# Patient Record
Sex: FEMALE | Race: BLACK OR AFRICAN AMERICAN | NOT HISPANIC OR LATINO | URBAN - METROPOLITAN AREA
[De-identification: names, ages, dates, MRNs, and addresses within clinical notes are randomized per-mention and may not be internally consistent; named-entity substitution may affect disease eponyms.]

---

## 2017-01-23 PROBLEM — Z00.00 ENCOUNTER FOR PREVENTIVE HEALTH EXAMINATION: Status: ACTIVE | Noted: 2017-01-23

## 2017-01-25 ENCOUNTER — OUTPATIENT (OUTPATIENT)
Dept: OUTPATIENT SERVICES | Facility: HOSPITAL | Age: 62
LOS: 1 days | Discharge: HOME | End: 2017-01-25

## 2017-03-15 ENCOUNTER — APPOINTMENT (OUTPATIENT)
Dept: VASCULAR SURGERY | Facility: CLINIC | Age: 62
End: 2017-03-15

## 2017-04-18 ENCOUNTER — OUTPATIENT (OUTPATIENT)
Dept: OUTPATIENT SERVICES | Facility: HOSPITAL | Age: 62
LOS: 1 days | Discharge: HOME | End: 2017-04-18

## 2017-06-27 DIAGNOSIS — R79.9 ABNORMAL FINDING OF BLOOD CHEMISTRY, UNSPECIFIED: ICD-10-CM

## 2017-08-09 ENCOUNTER — APPOINTMENT (OUTPATIENT)
Dept: VASCULAR SURGERY | Facility: CLINIC | Age: 62
End: 2017-08-09

## 2017-09-06 DIAGNOSIS — I67.89 OTHER CEREBROVASCULAR DISEASE: ICD-10-CM

## 2017-09-27 ENCOUNTER — APPOINTMENT (OUTPATIENT)
Dept: VASCULAR SURGERY | Facility: CLINIC | Age: 62
End: 2017-09-27
Payer: MEDICARE

## 2017-10-04 ENCOUNTER — APPOINTMENT (OUTPATIENT)
Dept: VASCULAR SURGERY | Facility: CLINIC | Age: 62
End: 2017-10-04
Payer: MEDICARE

## 2017-10-04 VITALS
HEART RATE: 88 BPM | DIASTOLIC BLOOD PRESSURE: 75 MMHG | HEIGHT: 64 IN | WEIGHT: 76.99 LBS | SYSTOLIC BLOOD PRESSURE: 138 MMHG | OXYGEN SATURATION: 98 % | BODY MASS INDEX: 13.14 KG/M2

## 2017-10-04 DIAGNOSIS — Z87.448 PERSONAL HISTORY OF OTHER DISEASES OF URINARY SYSTEM: ICD-10-CM

## 2017-10-04 DIAGNOSIS — Z86.79 PERSONAL HISTORY OF OTHER DISEASES OF THE CIRCULATORY SYSTEM: ICD-10-CM

## 2017-10-04 PROCEDURE — 99213 OFFICE O/P EST LOW 20 MIN: CPT

## 2017-11-15 ENCOUNTER — APPOINTMENT (OUTPATIENT)
Dept: VASCULAR SURGERY | Facility: CLINIC | Age: 62
End: 2017-11-15
Payer: MEDICARE

## 2017-11-15 VITALS
SYSTOLIC BLOOD PRESSURE: 120 MMHG | DIASTOLIC BLOOD PRESSURE: 75 MMHG | HEART RATE: 67 BPM | HEIGHT: 64 IN | OXYGEN SATURATION: 96 %

## 2017-11-15 DIAGNOSIS — I73.9 PERIPHERAL VASCULAR DISEASE, UNSPECIFIED: ICD-10-CM

## 2017-11-15 PROCEDURE — 99213 OFFICE O/P EST LOW 20 MIN: CPT

## 2017-11-15 PROCEDURE — 93922 UPR/L XTREMITY ART 2 LEVELS: CPT

## 2018-04-12 ENCOUNTER — OUTPATIENT (OUTPATIENT)
Dept: OUTPATIENT SERVICES | Facility: HOSPITAL | Age: 63
LOS: 1 days | Discharge: HOME | End: 2018-04-12

## 2018-04-12 DIAGNOSIS — I10 ESSENTIAL (PRIMARY) HYPERTENSION: ICD-10-CM

## 2018-04-12 DIAGNOSIS — E21.3 HYPERPARATHYROIDISM, UNSPECIFIED: ICD-10-CM

## 2018-07-23 ENCOUNTER — APPOINTMENT (OUTPATIENT)
Dept: VASCULAR SURGERY | Facility: CLINIC | Age: 63
End: 2018-07-23

## 2018-08-01 ENCOUNTER — APPOINTMENT (OUTPATIENT)
Dept: VASCULAR SURGERY | Facility: CLINIC | Age: 63
End: 2018-08-01

## 2018-08-09 ENCOUNTER — OUTPATIENT (OUTPATIENT)
Dept: OUTPATIENT SERVICES | Facility: HOSPITAL | Age: 63
LOS: 1 days | Discharge: HOME | End: 2018-08-09

## 2018-08-09 DIAGNOSIS — N18.6 END STAGE RENAL DISEASE: ICD-10-CM

## 2018-08-09 DIAGNOSIS — I69.351 HEMIPLEGIA AND HEMIPARESIS FOLLOWING CEREBRAL INFARCTION AFFECTING RIGHT DOMINANT SIDE: ICD-10-CM

## 2018-08-09 DIAGNOSIS — N25.81 SECONDARY HYPERPARATHYROIDISM OF RENAL ORIGIN: ICD-10-CM

## 2018-08-09 DIAGNOSIS — E11.9 TYPE 2 DIABETES MELLITUS WITHOUT COMPLICATIONS: ICD-10-CM

## 2018-08-09 DIAGNOSIS — G40.109 LOCALIZATION-RELATED (FOCAL) (PARTIAL) SYMPTOMATIC EPILEPSY AND EPILEPTIC SYNDROMES WITH SIMPLE PARTIAL SEIZURES, NOT INTRACTABLE, WITHOUT STATUS EPILEPTICUS: ICD-10-CM

## 2018-08-09 DIAGNOSIS — E07.9 DISORDER OF THYROID, UNSPECIFIED: ICD-10-CM

## 2018-10-03 ENCOUNTER — APPOINTMENT (OUTPATIENT)
Dept: VASCULAR SURGERY | Facility: CLINIC | Age: 63
End: 2018-10-03

## 2018-10-25 ENCOUNTER — OUTPATIENT (OUTPATIENT)
Dept: OUTPATIENT SERVICES | Facility: HOSPITAL | Age: 63
LOS: 1 days | Discharge: HOME | End: 2018-10-25

## 2018-10-29 DIAGNOSIS — I10 ESSENTIAL (PRIMARY) HYPERTENSION: ICD-10-CM

## 2018-10-29 DIAGNOSIS — D64.9 ANEMIA, UNSPECIFIED: ICD-10-CM

## 2018-11-07 ENCOUNTER — OUTPATIENT (OUTPATIENT)
Dept: OUTPATIENT SERVICES | Facility: HOSPITAL | Age: 63
LOS: 1 days | Discharge: HOME | End: 2018-11-07

## 2018-11-08 ENCOUNTER — OUTPATIENT (OUTPATIENT)
Dept: OUTPATIENT SERVICES | Facility: HOSPITAL | Age: 63
LOS: 1 days | Discharge: HOME | End: 2018-11-08

## 2018-11-08 DIAGNOSIS — Z02.9 ENCOUNTER FOR ADMINISTRATIVE EXAMINATIONS, UNSPECIFIED: ICD-10-CM

## 2018-11-09 DIAGNOSIS — N18.6 END STAGE RENAL DISEASE: ICD-10-CM

## 2018-11-20 ENCOUNTER — OUTPATIENT (OUTPATIENT)
Dept: OUTPATIENT SERVICES | Facility: HOSPITAL | Age: 63
LOS: 1 days | Discharge: HOME | End: 2018-11-20

## 2018-11-21 ENCOUNTER — APPOINTMENT (OUTPATIENT)
Dept: VASCULAR SURGERY | Facility: CLINIC | Age: 63
End: 2018-11-21

## 2018-11-23 DIAGNOSIS — N18.6 END STAGE RENAL DISEASE: ICD-10-CM

## 2019-04-10 ENCOUNTER — APPOINTMENT (OUTPATIENT)
Dept: OTOLARYNGOLOGY | Facility: CLINIC | Age: 64
End: 2019-04-10
Payer: MEDICAID

## 2019-04-10 PROCEDURE — 99204 OFFICE O/P NEW MOD 45 MIN: CPT | Mod: 25

## 2019-04-10 PROCEDURE — 31575 DIAGNOSTIC LARYNGOSCOPY: CPT

## 2019-04-10 NOTE — PROCEDURE
[Lesion] : lesion identified by mirror examination needing further evaluation [Topical Lidocaine] : topical lidocaine [Oxymetazoline HCl] : oxymetazoline HCl [Normal] : posterior cricoid area had healthy pink mucosa in the interarytenoid area and the esophageal inlet

## 2019-04-10 NOTE — HISTORY OF PRESENT ILLNESS
[de-identified] : Patient here today due to her thyroid. Patient was sent from dialysis. Patient admits having dysphagia. Patient states it has been going on for a while. Excessive mucus. Patient presents with tertiary hyperparathyroidism secondary to end stage renal disease. Patient currently on dialysis, sent from dialysis. Patient with complaints of dysphagia.

## 2019-05-19 ENCOUNTER — FORM ENCOUNTER (OUTPATIENT)
Age: 64
End: 2019-05-19

## 2019-05-20 ENCOUNTER — OUTPATIENT (OUTPATIENT)
Dept: OUTPATIENT SERVICES | Facility: HOSPITAL | Age: 64
LOS: 1 days | Discharge: HOME | End: 2019-05-20
Payer: MEDICARE

## 2019-05-20 VITALS
RESPIRATION RATE: 16 BRPM | HEART RATE: 84 BPM | DIASTOLIC BLOOD PRESSURE: 74 MMHG | OXYGEN SATURATION: 97 % | TEMPERATURE: 96 F | HEIGHT: 63 IN | SYSTOLIC BLOOD PRESSURE: 163 MMHG | WEIGHT: 143.3 LBS

## 2019-05-20 DIAGNOSIS — Z98.51 TUBAL LIGATION STATUS: Chronic | ICD-10-CM

## 2019-05-20 DIAGNOSIS — Z01.818 ENCOUNTER FOR OTHER PREPROCEDURAL EXAMINATION: ICD-10-CM

## 2019-05-20 DIAGNOSIS — Z98.890 OTHER SPECIFIED POSTPROCEDURAL STATES: Chronic | ICD-10-CM

## 2019-05-20 DIAGNOSIS — E21.2 OTHER HYPERPARATHYROIDISM: ICD-10-CM

## 2019-05-20 PROCEDURE — 93010 ELECTROCARDIOGRAM REPORT: CPT

## 2019-05-20 PROCEDURE — 71046 X-RAY EXAM CHEST 2 VIEWS: CPT | Mod: 26

## 2019-05-20 NOTE — H&P PST ADULT - NSICDXPASTSURGICALHX_GEN_ALL_CORE_FT
PAST SURGICAL HISTORY:  H/O tubal ligation     H/O umbilical hernia repair     S/P arteriovenous (AV) fistula creation

## 2019-05-20 NOTE — H&P PST ADULT - GASTROINTESTINAL
I will have my RN call to get a better understanding of any myalgias.  Rosuvastatin 5 mg once a day is a pretty minimal dose.  Options are pravastatin or PCSK9 Inhibitor.  Kyle Montana MD, FACC  June 21, 2017 10:23 PM     Soft, non-tender, no hepatosplenomegaly, normal bowel sounds

## 2019-05-20 NOTE — H&P PST ADULT - NSICDXPASTMEDICALHX_GEN_ALL_CORE_FT
PAST MEDICAL HISTORY:  Atrial fibrillation     Diabetes     ESRD (end stage renal disease) on dialysis tues, th,sa    HTN (hypertension)     Murmur     Peripheral neuropathy     Seizures 2009, per pt none since    Stroke 2009, 2013

## 2019-05-20 NOTE — H&P PST ADULT - REASON FOR ADMISSION
63 yo wc bound pt arrives from home w/ aid, poor historian, has very limited information pt able to provide hx when asked, but unsure of names of drs.; med list obtained from dialysis center. pt aware of upcoming surgery& is a&o x 3; unable to draw blood from patient, informed dialysis center& will obtain blood work necessarry 5/21/19 when @ dialysis. pt is aware.  pt wc bound, does not ambulate, denies chest pain, palpitations, shortness of breath, dyspnea, or dysuria (very little urination) 63 yo wc bound pt arrives from home w/ aid, poor historian, has very limited information pt able to provide hx when asked, but unsure of names of drs.; med list obtained from dialysis center. pt aware of upcoming surgery& is a&o x 3; pt is scheduled for parathyroidectomy, unable to draw blood from patient, informed dialysis center& will obtain blood work necessarry 5/21/19 when @ dialysis. pt is aware.  pt wc bound, does not ambulate, denies chest pain, palpitations, shortness of breath, dyspnea, or dysuria (very little urination) 65 yo wc bound pt arrives from home w/ aid, poor historian, has very limited information pt able to provide hx when asked, but unsure of names of drs.; med list obtained from dialysis center. pt aware of upcoming surgery& is a&o x 3; pt is scheduled for parathyroidectomy, unable to draw blood from patient, informed dialysis center& will obtain necessary blood work  on 5/21/19 when @ dialysis. pt is aware.  pt wc bound, does not ambulate, denies chest pain, palpitations, shortness of breath, dyspnea, or dysuria (very little urination)

## 2019-05-21 ENCOUNTER — OUTPATIENT (OUTPATIENT)
Dept: OUTPATIENT SERVICES | Facility: HOSPITAL | Age: 64
LOS: 1 days | Discharge: HOME | End: 2019-05-21

## 2019-05-21 ENCOUNTER — FORM ENCOUNTER (OUTPATIENT)
Age: 64
End: 2019-05-21

## 2019-05-21 DIAGNOSIS — Z98.890 OTHER SPECIFIED POSTPROCEDURAL STATES: Chronic | ICD-10-CM

## 2019-05-21 DIAGNOSIS — Z98.51 TUBAL LIGATION STATUS: Chronic | ICD-10-CM

## 2019-05-21 PROBLEM — I10 ESSENTIAL (PRIMARY) HYPERTENSION: Chronic | Status: ACTIVE | Noted: 2019-05-20

## 2019-05-21 PROBLEM — N18.6 END STAGE RENAL DISEASE: Chronic | Status: ACTIVE | Noted: 2019-05-20

## 2019-05-21 PROBLEM — G62.9 POLYNEUROPATHY, UNSPECIFIED: Chronic | Status: ACTIVE | Noted: 2019-05-20

## 2019-05-21 PROBLEM — R01.1 CARDIAC MURMUR, UNSPECIFIED: Chronic | Status: ACTIVE | Noted: 2019-05-20

## 2019-05-21 PROBLEM — I48.91 UNSPECIFIED ATRIAL FIBRILLATION: Chronic | Status: ACTIVE | Noted: 2019-05-20

## 2019-05-21 PROBLEM — E11.9 TYPE 2 DIABETES MELLITUS WITHOUT COMPLICATIONS: Chronic | Status: ACTIVE | Noted: 2019-05-20

## 2019-05-21 PROBLEM — I63.9 CEREBRAL INFARCTION, UNSPECIFIED: Chronic | Status: ACTIVE | Noted: 2019-05-20

## 2019-05-22 ENCOUNTER — OUTPATIENT (OUTPATIENT)
Dept: OUTPATIENT SERVICES | Facility: HOSPITAL | Age: 64
LOS: 1 days | Discharge: HOME | End: 2019-05-22
Payer: COMMERCIAL

## 2019-05-22 DIAGNOSIS — Z98.51 TUBAL LIGATION STATUS: Chronic | ICD-10-CM

## 2019-05-22 DIAGNOSIS — Z98.890 OTHER SPECIFIED POSTPROCEDURAL STATES: Chronic | ICD-10-CM

## 2019-05-22 DIAGNOSIS — E21.2 OTHER HYPERPARATHYROIDISM: ICD-10-CM

## 2019-05-22 PROCEDURE — 76536 US EXAM OF HEAD AND NECK: CPT | Mod: 26

## 2019-05-28 DIAGNOSIS — I10 ESSENTIAL (PRIMARY) HYPERTENSION: ICD-10-CM

## 2019-05-28 DIAGNOSIS — E11.9 TYPE 2 DIABETES MELLITUS WITHOUT COMPLICATIONS: ICD-10-CM

## 2019-05-28 DIAGNOSIS — D64.9 ANEMIA, UNSPECIFIED: ICD-10-CM

## 2019-05-28 DIAGNOSIS — I48.91 UNSPECIFIED ATRIAL FIBRILLATION: ICD-10-CM

## 2019-06-01 ENCOUNTER — OUTPATIENT (OUTPATIENT)
Dept: OUTPATIENT SERVICES | Facility: HOSPITAL | Age: 64
LOS: 1 days | Discharge: HOME | End: 2019-06-01

## 2019-06-01 DIAGNOSIS — I48.91 UNSPECIFIED ATRIAL FIBRILLATION: ICD-10-CM

## 2019-06-01 DIAGNOSIS — Z98.51 TUBAL LIGATION STATUS: Chronic | ICD-10-CM

## 2019-06-01 DIAGNOSIS — Z98.890 OTHER SPECIFIED POSTPROCEDURAL STATES: Chronic | ICD-10-CM

## 2019-06-02 PROBLEM — R56.9 UNSPECIFIED CONVULSIONS: Chronic | Status: ACTIVE | Noted: 2019-05-20

## 2019-06-03 ENCOUNTER — INPATIENT (INPATIENT)
Facility: HOSPITAL | Age: 64
LOS: 5 days | Discharge: HOME HEALTH | End: 2019-06-09
Attending: OTOLARYNGOLOGY | Admitting: OTOLARYNGOLOGY
Payer: MEDICARE

## 2019-06-03 ENCOUNTER — APPOINTMENT (OUTPATIENT)
Dept: OTOLARYNGOLOGY | Facility: HOSPITAL | Age: 64
End: 2019-06-03
Payer: MEDICARE

## 2019-06-03 ENCOUNTER — RESULT REVIEW (OUTPATIENT)
Age: 64
End: 2019-06-03

## 2019-06-03 VITALS
HEIGHT: 64 IN | SYSTOLIC BLOOD PRESSURE: 145 MMHG | HEART RATE: 70 BPM | RESPIRATION RATE: 18 BRPM | WEIGHT: 138.01 LBS | DIASTOLIC BLOOD PRESSURE: 76 MMHG | TEMPERATURE: 98 F

## 2019-06-03 DIAGNOSIS — Z98.51 TUBAL LIGATION STATUS: Chronic | ICD-10-CM

## 2019-06-03 DIAGNOSIS — Z98.890 OTHER SPECIFIED POSTPROCEDURAL STATES: Chronic | ICD-10-CM

## 2019-06-03 LAB
ALBUMIN SERPL ELPH-MCNC: 3.3 G/DL — LOW (ref 3.5–5.2)
ANION GAP SERPL CALC-SCNC: 16 MMOL/L — HIGH (ref 7–14)
BUN SERPL-MCNC: 43 MG/DL — HIGH (ref 10–20)
CALCIUM SERPL-MCNC: 9.2 MG/DL — SIGNIFICANT CHANGE UP (ref 8.5–10.1)
CALCIUM SERPL-MCNC: 9.2 MG/DL — SIGNIFICANT CHANGE UP (ref 8.5–10.1)
CHLORIDE SERPL-SCNC: 100 MMOL/L — SIGNIFICANT CHANGE UP (ref 98–110)
CK MB CFR SERPL CALC: 1.9 NG/ML — SIGNIFICANT CHANGE UP (ref 0.6–6.3)
CK SERPL-CCNC: 79 U/L — SIGNIFICANT CHANGE UP (ref 0–225)
CO2 SERPL-SCNC: 25 MMOL/L — SIGNIFICANT CHANGE UP (ref 17–32)
CREAT SERPL-MCNC: 6.5 MG/DL — CRITICAL HIGH (ref 0.7–1.5)
GAS PNL BLDA: SIGNIFICANT CHANGE UP
GLUCOSE BLDC GLUCOMTR-MCNC: 102 MG/DL — HIGH (ref 70–99)
GLUCOSE BLDC GLUCOMTR-MCNC: 128 MG/DL — HIGH (ref 70–99)
GLUCOSE SERPL-MCNC: 128 MG/DL — HIGH (ref 70–99)
MAGNESIUM SERPL-MCNC: 2.1 MG/DL — SIGNIFICANT CHANGE UP (ref 1.8–2.4)
POTASSIUM SERPL-MCNC: 4.1 MMOL/L — SIGNIFICANT CHANGE UP (ref 3.5–5)
POTASSIUM SERPL-SCNC: 4.1 MMOL/L — SIGNIFICANT CHANGE UP (ref 3.5–5)
PTH-INTACT IO % DIF SERPL: 277.2 PG/ML — HIGH (ref 19–73)
SODIUM SERPL-SCNC: 141 MMOL/L — SIGNIFICANT CHANGE UP (ref 135–146)
TROPONIN T SERPL-MCNC: 0.03 NG/ML — CRITICAL HIGH

## 2019-06-03 PROCEDURE — 93010 ELECTROCARDIOGRAM REPORT: CPT

## 2019-06-03 PROCEDURE — 88305 TISSUE EXAM BY PATHOLOGIST: CPT | Mod: 26

## 2019-06-03 PROCEDURE — 71045 X-RAY EXAM CHEST 1 VIEW: CPT | Mod: 26

## 2019-06-03 PROCEDURE — 60500 EXPLORE PARATHYROID GLANDS: CPT

## 2019-06-03 PROCEDURE — 99223 1ST HOSP IP/OBS HIGH 75: CPT

## 2019-06-03 RX ORDER — CALCIUM CARBONATE 500(1250)
2 TABLET ORAL
Refills: 0 | Status: DISCONTINUED | OUTPATIENT
Start: 2019-06-03 | End: 2019-06-04

## 2019-06-03 RX ORDER — INSULIN GLARGINE 100 [IU]/ML
10 INJECTION, SOLUTION SUBCUTANEOUS
Qty: 0 | Refills: 0 | DISCHARGE

## 2019-06-03 RX ORDER — DOCUSATE SODIUM 100 MG
1 CAPSULE ORAL
Qty: 0 | Refills: 0 | DISCHARGE

## 2019-06-03 RX ORDER — SODIUM CHLORIDE 9 MG/ML
1000 INJECTION, SOLUTION INTRAVENOUS
Refills: 0 | Status: DISCONTINUED | OUTPATIENT
Start: 2019-06-03 | End: 2019-06-04

## 2019-06-03 RX ORDER — METOPROLOL TARTRATE 50 MG
1 TABLET ORAL
Qty: 0 | Refills: 0 | DISCHARGE

## 2019-06-03 RX ORDER — DOCUSATE SODIUM 100 MG
100 CAPSULE ORAL
Refills: 0 | Status: DISCONTINUED | OUTPATIENT
Start: 2019-06-03 | End: 2019-06-09

## 2019-06-03 RX ORDER — AMLODIPINE BESYLATE 2.5 MG/1
5 TABLET ORAL DAILY
Refills: 0 | Status: DISCONTINUED | OUTPATIENT
Start: 2019-06-03 | End: 2019-06-04

## 2019-06-03 RX ORDER — INSULIN LISPRO 100/ML
VIAL (ML) SUBCUTANEOUS
Refills: 0 | Status: DISCONTINUED | OUTPATIENT
Start: 2019-06-03 | End: 2019-06-06

## 2019-06-03 RX ORDER — LEVETIRACETAM 250 MG/1
1 TABLET, FILM COATED ORAL
Qty: 0 | Refills: 0 | DISCHARGE

## 2019-06-03 RX ORDER — SODIUM CHLORIDE 9 MG/ML
1000 INJECTION INTRAMUSCULAR; INTRAVENOUS; SUBCUTANEOUS
Refills: 0 | Status: DISCONTINUED | OUTPATIENT
Start: 2019-06-03 | End: 2019-06-03

## 2019-06-03 RX ORDER — OXYCODONE AND ACETAMINOPHEN 5; 325 MG/1; MG/1
2 TABLET ORAL EVERY 4 HOURS
Refills: 0 | Status: DISCONTINUED | OUTPATIENT
Start: 2019-06-03 | End: 2019-06-03

## 2019-06-03 RX ORDER — CALCITRIOL 0.5 UG/1
0.5 CAPSULE ORAL EVERY 12 HOURS
Refills: 0 | Status: DISCONTINUED | OUTPATIENT
Start: 2019-06-03 | End: 2019-06-04

## 2019-06-03 RX ORDER — GLUCAGON INJECTION, SOLUTION 0.5 MG/.1ML
1 INJECTION, SOLUTION SUBCUTANEOUS ONCE
Refills: 0 | Status: DISCONTINUED | OUTPATIENT
Start: 2019-06-03 | End: 2019-06-03

## 2019-06-03 RX ORDER — SODIUM CHLORIDE 9 MG/ML
3 INJECTION INTRAMUSCULAR; INTRAVENOUS; SUBCUTANEOUS EVERY 8 HOURS
Refills: 0 | Status: DISCONTINUED | OUTPATIENT
Start: 2019-06-03 | End: 2019-06-09

## 2019-06-03 RX ORDER — GABAPENTIN 400 MG/1
100 CAPSULE ORAL THREE TIMES A DAY
Refills: 0 | Status: DISCONTINUED | OUTPATIENT
Start: 2019-06-03 | End: 2019-06-09

## 2019-06-03 RX ORDER — APIXABAN 2.5 MG/1
1 TABLET, FILM COATED ORAL
Qty: 0 | Refills: 0 | DISCHARGE

## 2019-06-03 RX ORDER — DEXTROSE 50 % IN WATER 50 %
15 SYRINGE (ML) INTRAVENOUS ONCE
Refills: 0 | Status: DISCONTINUED | OUTPATIENT
Start: 2019-06-03 | End: 2019-06-09

## 2019-06-03 RX ORDER — MORPHINE SULFATE 50 MG/1
2 CAPSULE, EXTENDED RELEASE ORAL EVERY 4 HOURS
Refills: 0 | Status: DISCONTINUED | OUTPATIENT
Start: 2019-06-03 | End: 2019-06-03

## 2019-06-03 RX ORDER — OXYCODONE HYDROCHLORIDE 5 MG/1
5 TABLET ORAL EVERY 6 HOURS
Refills: 0 | Status: DISCONTINUED | OUTPATIENT
Start: 2019-06-03 | End: 2019-06-09

## 2019-06-03 RX ORDER — METOPROLOL TARTRATE 50 MG
25 TABLET ORAL
Refills: 0 | Status: DISCONTINUED | OUTPATIENT
Start: 2019-06-03 | End: 2019-06-06

## 2019-06-03 RX ORDER — OXYCODONE HYDROCHLORIDE 5 MG/1
10 TABLET ORAL EVERY 6 HOURS
Refills: 0 | Status: DISCONTINUED | OUTPATIENT
Start: 2019-06-03 | End: 2019-06-09

## 2019-06-03 RX ORDER — HEPARIN SODIUM 5000 [USP'U]/ML
5000 INJECTION INTRAVENOUS; SUBCUTANEOUS EVERY 8 HOURS
Refills: 0 | Status: DISCONTINUED | OUTPATIENT
Start: 2019-06-03 | End: 2019-06-06

## 2019-06-03 RX ORDER — ACETAMINOPHEN 500 MG
650 TABLET ORAL EVERY 4 HOURS
Refills: 0 | Status: DISCONTINUED | OUTPATIENT
Start: 2019-06-03 | End: 2019-06-06

## 2019-06-03 RX ORDER — RANITIDINE HYDROCHLORIDE 150 MG/1
0 TABLET, FILM COATED ORAL
Qty: 0 | Refills: 0 | DISCHARGE

## 2019-06-03 RX ORDER — LEVETIRACETAM 250 MG/1
250 TABLET, FILM COATED ORAL
Refills: 0 | Status: DISCONTINUED | OUTPATIENT
Start: 2019-06-03 | End: 2019-06-09

## 2019-06-03 RX ORDER — DEXTROSE 50 % IN WATER 50 %
25 SYRINGE (ML) INTRAVENOUS ONCE
Refills: 0 | Status: DISCONTINUED | OUTPATIENT
Start: 2019-06-03 | End: 2019-06-03

## 2019-06-03 RX ORDER — INSULIN ASPART 100 [IU]/ML
0 INJECTION, SOLUTION SUBCUTANEOUS
Qty: 0 | Refills: 0 | DISCHARGE

## 2019-06-03 RX ORDER — AMLODIPINE BESYLATE 2.5 MG/1
1 TABLET ORAL
Qty: 0 | Refills: 0 | DISCHARGE

## 2019-06-03 RX ORDER — GABAPENTIN 400 MG/1
0 CAPSULE ORAL
Qty: 0 | Refills: 0 | DISCHARGE

## 2019-06-03 RX ORDER — INSULIN GLARGINE 100 [IU]/ML
10 INJECTION, SOLUTION SUBCUTANEOUS AT BEDTIME
Refills: 0 | Status: DISCONTINUED | OUTPATIENT
Start: 2019-06-03 | End: 2019-06-09

## 2019-06-03 RX ORDER — MORPHINE SULFATE 50 MG/1
2 CAPSULE, EXTENDED RELEASE ORAL
Refills: 0 | Status: DISCONTINUED | OUTPATIENT
Start: 2019-06-03 | End: 2019-06-03

## 2019-06-03 RX ORDER — SEVELAMER CARBONATE 2400 MG/1
1600 POWDER, FOR SUSPENSION ORAL
Refills: 0 | Status: DISCONTINUED | OUTPATIENT
Start: 2019-06-03 | End: 2019-06-03

## 2019-06-03 RX ORDER — SENNA PLUS 8.6 MG/1
1 TABLET ORAL AT BEDTIME
Refills: 0 | Status: DISCONTINUED | OUTPATIENT
Start: 2019-06-03 | End: 2019-06-05

## 2019-06-03 RX ORDER — PANTOPRAZOLE SODIUM 20 MG/1
40 TABLET, DELAYED RELEASE ORAL AT BEDTIME
Refills: 0 | Status: DISCONTINUED | OUTPATIENT
Start: 2019-06-03 | End: 2019-06-05

## 2019-06-03 RX ORDER — DEXTROSE 50 % IN WATER 50 %
12.5 SYRINGE (ML) INTRAVENOUS ONCE
Refills: 0 | Status: DISCONTINUED | OUTPATIENT
Start: 2019-06-03 | End: 2019-06-03

## 2019-06-03 RX ORDER — OXYCODONE AND ACETAMINOPHEN 5; 325 MG/1; MG/1
1 TABLET ORAL EVERY 4 HOURS
Refills: 0 | Status: DISCONTINUED | OUTPATIENT
Start: 2019-06-03 | End: 2019-06-03

## 2019-06-03 RX ORDER — SODIUM CHLORIDE 9 MG/ML
1000 INJECTION INTRAMUSCULAR; INTRAVENOUS; SUBCUTANEOUS
Refills: 0 | Status: DISCONTINUED | OUTPATIENT
Start: 2019-06-03 | End: 2019-06-04

## 2019-06-03 RX ADMIN — PANTOPRAZOLE SODIUM 40 MILLIGRAM(S): 20 TABLET, DELAYED RELEASE ORAL at 23:12

## 2019-06-03 RX ADMIN — CALCITRIOL 0.5 MICROGRAM(S): 0.5 CAPSULE ORAL at 19:48

## 2019-06-03 RX ADMIN — SODIUM CHLORIDE 3 MILLILITER(S): 9 INJECTION INTRAMUSCULAR; INTRAVENOUS; SUBCUTANEOUS at 23:12

## 2019-06-03 RX ADMIN — OXYCODONE HYDROCHLORIDE 10 MILLIGRAM(S): 5 TABLET ORAL at 22:00

## 2019-06-03 RX ADMIN — Medication 25 MILLIGRAM(S): at 19:48

## 2019-06-03 RX ADMIN — Medication 2 TABLET(S): at 20:29

## 2019-06-03 RX ADMIN — GABAPENTIN 100 MILLIGRAM(S): 400 CAPSULE ORAL at 21:58

## 2019-06-03 RX ADMIN — HEPARIN SODIUM 5000 UNIT(S): 5000 INJECTION INTRAVENOUS; SUBCUTANEOUS at 23:10

## 2019-06-03 RX ADMIN — SENNA PLUS 1 TABLET(S): 8.6 TABLET ORAL at 22:36

## 2019-06-03 RX ADMIN — LEVETIRACETAM 250 MILLIGRAM(S): 250 TABLET, FILM COATED ORAL at 19:47

## 2019-06-03 RX ADMIN — INSULIN GLARGINE 10 UNIT(S): 100 INJECTION, SOLUTION SUBCUTANEOUS at 23:10

## 2019-06-03 RX ADMIN — Medication 100 MILLIGRAM(S): at 19:48

## 2019-06-03 NOTE — PROGRESS NOTE ADULT - ASSESSMENT
64 y.o female with tertiary hyperparathyroidism, s/p 3 gland parathyroidectomy - POD #0, doing well.    ·	cont to monitor serum caclium (corrected)/ionized calcium  ·	replace calcium as per renal recommendations  ·	PO rocaltrol, calcium carbonate  ·	renal diet - can advance from clears -> full liquids -> soft renal diet as tolerated  ·	pain control, antiemetics prn  ·	resume HD (T/T/S)  ·	monitor hemovac output  ·	D/C sevelamer as per renal  ·	FS qAC +HS, coverage with insulin as needed (on novolog at home) - continued on Lantus at bedtime  ·	cont home meds  ·	ICU management until calcium is stable/no further need for IV replacement  ·	w/d with attng

## 2019-06-03 NOTE — ASU PATIENT PROFILE, ADULT - PMH
Atrial fibrillation    Diabetes    ESRD (end stage renal disease) on dialysis  tues, th,sa  HTN (hypertension)    Murmur    Peripheral neuropathy    Seizures  2009, per pt none since  Stroke  2009, 2013

## 2019-06-03 NOTE — CONSULT NOTE ADULT - SUBJECTIVE AND OBJECTIVE BOX
SICU Consultation Note  ======================================================================================================  GIL GARCIA  MRN-[id number]    64 y.o F with past medical hx as below, presents  S/P parathyroidectomy secondary to tertiary hyperparathyroidsm. Prior to surgery patient experiencing throat discomfort and dysphagia, tertiary hyperparathyroidsm likely secondary to ESRD.  Intraoperative findings 4 gland with hyperplasia, RT inferior, left inferior and left superior glans removed after frozen section confirmation.  Course otherwise uncomplicated. PT extubated transfered to PACU in stable and guarded conditions. SICU consulted for close monitoring in the setting of past medical hx of multiple comorbidities, and close monitoring of possible electrolyte imbalances.       Procedure:  OR time: 1hr 56min     EBL: 10cc          IV Fluids: 800cc       Blood Products: N/A                UOP: N/A          PAST MEDICAL & SURGICAL HISTORY:  Atrial fibrillation  Murmur  Peripheral neuropathy  Seizures: 2009, per pt none since  Diabetes  ESRD (end stage renal disease) on dialysis: tues, th,sa  Stroke: 2009, 2013  HTN (hypertension)  H/O umbilical hernia repair  H/O tubal ligation  S/P arteriovenous (AV) fistula creation      Home Meds:   Home Medications:  Colace 100 mg oral capsule: 1 cap(s) orally 2 times a day (03 Jun 2019 10:11)  Eliquis 2.5 mg oral tablet: 1 tab(s) orally 2 times a day (03 Jun 2019 10:11)  gabapentin 100 mg oral tablet: orally 3 times a day (03 Jun 2019 10:11)  Keppra 250 mg oral tablet: 1 tab(s) orally 2 times a day (03 Jun 2019 10:11)  Lantus 100 units/mL subcutaneous solution: 10 unit(s) subcutaneous once a day (at bedtime) (03 Jun 2019 10:11)  Metoprolol Tartrate 25 mg oral tablet: 1 tab(s) orally 2 times a day (03 Jun 2019 10:11)  Norvasc 5 mg oral tablet: 1 tab(s) orally once a day (03 Jun 2019 10:11)  NovoLOG 100 units/mL subcutaneous solution: sliding scale , ac (03 Jun 2019 10:11)  raNITIdine 150 mg oral tablet: orally once a day (03 Jun 2019 10:11)  sevelamer hydrochloride 800 mg oral tablet: 2 tab(s) orally 3 times a day (03 Jun 2019 10:11)      Allergies    No Known Allergies    Intolerances          Advanced Directives: Full Code      CURRENT MEDICATIONS:   acetaminophen   Tablet .. 650 milliGRAM(s) Oral every 4 hours PRN  amLODIPine   Tablet 5 milliGRAM(s) Oral daily  calcitriol   Capsule 0.5 MICROGram(s) Oral every 12 hours  docusate sodium 100 milliGRAM(s) Oral two times a day  gabapentin 100 milliGRAM(s) Oral three times a day  insulin glargine Injectable (LANTUS) 10 Unit(s) SubCutaneous at bedtime  levETIRAcetam 250 milliGRAM(s) Oral two times a day  metoprolol tartrate 25 milliGRAM(s) Oral two times a day  morphine  - Injectable 2 milliGRAM(s) IV Push every 4 hours PRN  morphine  - Injectable 2 milliGRAM(s) IV Push every 10 minutes PRN  oxyCODONE    5 mG/acetaminophen 325 mG 1 Tablet(s) Oral every 4 hours PRN  oxyCODONE    5 mG/acetaminophen 325 mG 2 Tablet(s) Oral every 4 hours PRN  sodium chloride 0.9% lock flush 3 milliLiter(s) IV Push every 8 hours  sodium chloride 0.9%. 1000 milliLiter(s) (50 mL/Hr) IV Continuous <Continuous>            VITAL SIGNS, INS/OUTS (Last 24hours):    ICU Vital Signs Last 24 Hrs  T(C): 36.4 (03 Jun 2019 16:23), Max: 36.6 (03 Jun 2019 10:14)  T(F): 97.6 (03 Jun 2019 16:23), Max: 97.8 (03 Jun 2019 10:14)  HR: 78 (03 Jun 2019 16:38) (70 - 82)  BP: 155/66 (03 Jun 2019 16:38) (145/76 - 155/66)  BP(mean): --  ABP: 166/70 (03 Jun 2019 16:38) (158/62 - 166/70)  ABP(mean): 112 (03 Jun 2019 16:38) (106 - 112)  RR: 11 (03 Jun 2019 16:38) (11 - 18)  SpO2: 95% (03 Jun 2019 16:38) (95% - 96%)    I&O's Summary        Height (cm): 162.56 (06-03-19)  Weight (kg): 62.6 (06-03-19)  BMI (kg/m2): 23.7 (06-03-19)  BSA (m2): 1.67 (06-03-19)    Physical Exam: Conducted Dr. Lico Cruz  ---------------------------------------------------------------------------------------  RASS:   GCS:    E/M/V  Exam: A&Ox3, no focal deficits    RESPIRATORY:  Intubated/Tracheostomy  Lungs clear to auscultation b/l, Normal expansion/effort  Mechanical Ventilation:     CARDIOVASCULAR:   S1/S2.  RRR  No peripheral edema    GASTROINTESTINAL:  Abdomen soft, non-tender, non-distended, no wounds or discoloration  Colostomy/Ileostomy/NG/OG tube in place    MUSCULOSKELETAL:  Extremities warm, pink, well-perfused.  Palpable/Doppler pulses signals      DERM:  No skin breakdown     :   Exam: Kelly catheter in place.       Tubes/Lines/Drains   ----------------------------------------------------------------------------------------------------------  [x] Peripheral IV  [] Central Venous Line    R/L        IJ/Femoral             Date Placed:    [] Arterial Line		   R/L         Radial/Femoral    Date Placed:   [] PICC:         	[] Midline		                                  Date Placed:   [] Urinary Catheter Kelly                                             Date Placed:       LABS  --------------------------------------------------------------------------------------  LFTs      Cardiac Markers        Coagulation      Arterial Blood Gas  ABG - ( 03 Jun 2019 16:48 )  pH, Arterial: 7.38  pH, Blood: x     /  pCO2: 47    /  pO2: 165   / HCO3: 28    / Base Excess: 2.4   /  SaO2: 99                  Blood Sugar  CAPILLARY BLOOD GLUCOSE      POCT Blood Glucose.: 102 mg/dL (03 Jun 2019 10:10)      Urinalysis      Cultures            CT/XRAY/ECHO/TCD/EEG  ----------------------------------------------------------------------------------------------          --------------------------------------------------------------------------------------  Admit Diagnosis: E21.2/63691     Critical Care Diagnoses: SICU Consultation Note  ======================================================================================================  GIL GARCIA  MRN-[id number]    64 y.o F with past medical hx as below, presents  S/P parathyroidectomy secondary to tertiary hyperparathyroidsm. Prior to surgery patient experiencing throat discomfort and dysphagia, tertiary hyperparathyroidsm likely secondary to ESRD.  Intraoperative findings 4 gland with hyperplasia, RT inferior, left inferior and left superior glans removed after frozen section confirmation.  Course otherwise uncomplicated. PT extubated transfered to PACU in stable and guarded conditions. SICU consulted for close monitoring in the setting of past medical hx of multiple comorbidities, and close monitoring of possible electrolyte imbalances.       Procedure:  OR time: 1hr 56min     EBL: 10cc          IV Fluids: 800cc       Blood Products: N/A                UOP: N/A          PAST MEDICAL & SURGICAL HISTORY:  Atrial fibrillation  Murmur  Peripheral neuropathy  Seizures: 2009, per pt none since  Diabetes  ESRD (end stage renal disease) on dialysis: tues, th,sa  Stroke: 2009, 2013  HTN (hypertension)  H/O umbilical hernia repair  H/O tubal ligation  S/P arteriovenous (AV) fistula creation      Home Meds:   Home Medications:  Colace 100 mg oral capsule: 1 cap(s) orally 2 times a day (03 Jun 2019 10:11)  Eliquis 2.5 mg oral tablet: 1 tab(s) orally 2 times a day (03 Jun 2019 10:11)  gabapentin 100 mg oral tablet: orally 3 times a day (03 Jun 2019 10:11)  Keppra 250 mg oral tablet: 1 tab(s) orally 2 times a day (03 Jun 2019 10:11)  Lantus 100 units/mL subcutaneous solution: 10 unit(s) subcutaneous once a day (at bedtime) (03 Jun 2019 10:11)  Metoprolol Tartrate 25 mg oral tablet: 1 tab(s) orally 2 times a day (03 Jun 2019 10:11)  Norvasc 5 mg oral tablet: 1 tab(s) orally once a day (03 Jun 2019 10:11)  NovoLOG 100 units/mL subcutaneous solution: sliding scale , ac (03 Jun 2019 10:11)  raNITIdine 150 mg oral tablet: orally once a day (03 Jun 2019 10:11)  sevelamer hydrochloride 800 mg oral tablet: 2 tab(s) orally 3 times a day (03 Jun 2019 10:11)      Allergies    No Known Allergies    Intolerances          Advanced Directives: Full Code      CURRENT MEDICATIONS:   acetaminophen   Tablet .. 650 milliGRAM(s) Oral every 4 hours PRN  amLODIPine   Tablet 5 milliGRAM(s) Oral daily  calcitriol   Capsule 0.5 MICROGram(s) Oral every 12 hours  docusate sodium 100 milliGRAM(s) Oral two times a day  gabapentin 100 milliGRAM(s) Oral three times a day  insulin glargine Injectable (LANTUS) 10 Unit(s) SubCutaneous at bedtime  levETIRAcetam 250 milliGRAM(s) Oral two times a day  metoprolol tartrate 25 milliGRAM(s) Oral two times a day  oxyCODONE    5 mG/acetaminophen 325 mG 1 Tablet(s) Oral every 4 hours PRN  oxyCODONE    5 mG/acetaminophen 325 mG 2 Tablet(s) Oral every 4 hours PRN  sodium chloride 0.9% lock flush 3 milliLiter(s) IV Push every 8 hours  sodium chloride 0.9%. 1000 milliLiter(s) (50 mL/Hr) IV Continuous <Continuous>    VITAL SIGNS, INS/OUTS (Last 24hours):    ICU Vital Signs Last 24 Hrs  T(C): 36.4 (03 Jun 2019 16:23), Max: 36.6 (03 Jun 2019 10:14)  T(F): 97.6 (03 Jun 2019 16:23), Max: 97.8 (03 Jun 2019 10:14)  HR: 78 (03 Jun 2019 16:38) (70 - 82)  BP: 155/66 (03 Jun 2019 16:38) (145/76 - 155/66)  BP(mean): --  ABP: 166/70 (03 Jun 2019 16:38) (158/62 - 166/70)  ABP(mean): 112 (03 Jun 2019 16:38) (106 - 112)  RR: 11 (03 Jun 2019 16:38) (11 - 18)  SpO2: 95% (03 Jun 2019 16:38) (95% - 96%)    I&O's Summary        Height (cm): 162.56 (06-03-19)  Weight (kg): 62.6 (06-03-19)  BMI (kg/m2): 23.7 (06-03-19)  BSA (m2): 1.67 (06-03-19)    Physical Exam: Conducted Dr. Lico Cruz  ---------------------------------------------------------------------------------------  RASS:   GCS:    E/M/V  Exam: A&Ox3, no focal deficits    RESPIRATORY:  Intubated/Tracheostomy  Lungs clear to auscultation b/l, Normal exp    CARDIOVASCULAR   S1/S2.  RRR  No peripheral edema    GASTROINTESTINAL:  Abdomen soft, non-tender, non-distended, no wounds or discoloration  Colostomy/Ileostomy/NG/OG tube in place    MUSCULOSKELETAL:  Extremities warm, pink, well-perfused.  Palpable/Doppler pulses signals      DERM:  No skin breakdown     :   Exam: Kelly catheter in place.       Tubes/Lines/Drains   ----------------------------------------------------------------------------------------------------------  [x] Peripheral IV  [] Central Venous Line    R/L        IJ/Femoral             Date Placed:    [] Arterial Line		   R/L         Radial/Femoral    Date Placed:   [] PICC:         	[] Midline		                                  Date Placed:   [] Urinary Catheter Kelly                                             Date Placed:       LABS  --------------------------------------------------------------------------------------  LFTs      Cardiac Markers        Coagulation      Arterial Blood Gas  ABG - ( 03 Jun 2019 16:48 )  pH, Arterial: 7.38  pH, Blood: x     /  pCO2: 47    /  pO2: 165   / HCO3: 28    / Base Excess: 2.4   /  SaO2: 99                  Blood Sugar  CAPILLARY BLOOD GLUCOSE      POCT Blood Glucose.: 102 mg/dL (03 Jun 2019 10:10)      Urinalysis      Cultures            CT/XRAY/ECHO/TCD/EEG  ----------------------------------------------------------------------------------------------          --------------------------------------------------------------------------------------  Admit Diagnosis: E21.2/84372     Critical Care Diagnoses: SICU Consultation Note  ======================================================================================================  GIL GARCIA  MRN-[id number]    64 y.o F with past medical hx as below, presents  S/P parathyroidectomy secondary to tertiary hyperparathyroidsm. Prior to surgery patient experiencing throat discomfort and dysphagia, tertiary hyperparathyroidsm likely secondary to ESRD.  Intraoperative findings 4 gland with hyperplasia, RT inferior, left inferior and left superior glans removed after frozen section confirmation.  Course otherwise uncomplicated. PT extubated transfered to PACU in stable and guarded conditions. SICU consulted for close monitoring in the setting of past medical hx of multiple comorbidities, and close monitoring of possible electrolyte imbalances.       Procedure:  OR time: 1hr 56min     EBL: 10cc          IV Fluids: 800cc       Blood Products: N/A                UOP: N/A          PAST MEDICAL & SURGICAL HISTORY:  Atrial fibrillation  Murmur  Peripheral neuropathy  Seizures: 2009, per pt none since  Diabetes  ESRD (end stage renal disease) on dialysis: tues, th,sa  Stroke: 2009, 2013  HTN (hypertension)  H/O umbilical hernia repair  H/O tubal ligation  S/P arteriovenous (AV) fistula creation      Home Meds:   Home Medications:  Colace 100 mg oral capsule: 1 cap(s) orally 2 times a day (03 Jun 2019 10:11)  Eliquis 2.5 mg oral tablet: 1 tab(s) orally 2 times a day (03 Jun 2019 10:11)  gabapentin 100 mg oral tablet: orally 3 times a day (03 Jun 2019 10:11)  Keppra 250 mg oral tablet: 1 tab(s) orally 2 times a day (03 Jun 2019 10:11)  Lantus 100 units/mL subcutaneous solution: 10 unit(s) subcutaneous once a day (at bedtime) (03 Jun 2019 10:11)  Metoprolol Tartrate 25 mg oral tablet: 1 tab(s) orally 2 times a day (03 Jun 2019 10:11)  Norvasc 5 mg oral tablet: 1 tab(s) orally once a day (03 Jun 2019 10:11)  NovoLOG 100 units/mL subcutaneous solution: sliding scale , ac (03 Jun 2019 10:11)  raNITIdine 150 mg oral tablet: orally once a day (03 Jun 2019 10:11)  sevelamer hydrochloride 800 mg oral tablet: 2 tab(s) orally 3 times a day (03 Jun 2019 10:11)      Allergies    No Known Allergies    Intolerances          Advanced Directives: Full Code      CURRENT MEDICATIONS:   acetaminophen   Tablet .. 650 milliGRAM(s) Oral every 4 hours PRN  amLODIPine   Tablet 5 milliGRAM(s) Oral daily  calcitriol   Capsule 0.5 MICROGram(s) Oral every 12 hours  docusate sodium 100 milliGRAM(s) Oral two times a day  gabapentin 100 milliGRAM(s) Oral three times a day  insulin glargine Injectable (LANTUS) 10 Unit(s) SubCutaneous at bedtime  levETIRAcetam 250 milliGRAM(s) Oral two times a day  metoprolol tartrate 25 milliGRAM(s) Oral two times a day  oxyCODONE    5 mG/acetaminophen 325 mG 1 Tablet(s) Oral every 4 hours PRN  oxyCODONE    5 mG/acetaminophen 325 mG 2 Tablet(s) Oral every 4 hours PRN  sodium chloride 0.9% lock flush 3 milliLiter(s) IV Push every 8 hours  sodium chloride 0.9%. 1000 milliLiter(s) (50 mL/Hr) IV Continuous <Continuous>    VITAL SIGNS, INS/OUTS (Last 24hours):    ICU Vital Signs Last 24 Hrs  T(C): 36.4 (03 Jun 2019 16:23), Max: 36.6 (03 Jun 2019 10:14)  T(F): 97.6 (03 Jun 2019 16:23), Max: 97.8 (03 Jun 2019 10:14)  HR: 78 (03 Jun 2019 16:38) (70 - 82)  BP: 155/66 (03 Jun 2019 16:38) (145/76 - 155/66)  BP(mean): --  ABP: 166/70 (03 Jun 2019 16:38) (158/62 - 166/70)  ABP(mean): 112 (03 Jun 2019 16:38) (106 - 112)  RR: 11 (03 Jun 2019 16:38) (11 - 18)  SpO2: 95% (03 Jun 2019 16:38) (95% - 96%)    I&O's Summary        Height (cm): 162.56 (06-03-19)  Weight (kg): 62.6 (06-03-19)  BMI (kg/m2): 23.7 (06-03-19)  BSA (m2): 1.67 (06-03-19)    Physical Exam: Conducted Dr. Lico Cruz  ---------------------------------------------------------------------------------------  RASS:   GCS:    E/M/V  Exam: A&Ox3, no focal deficits    RESPIRATORY:  Lungs clear to auscultation b/l, Normal exp, negative for stridor and rales.     ENT: normal voice sounds, negative for stridor or signs of obstruction. no blood seen in the pharynx.    Neck: wound dressing on right side of the neck, clean and dry. Drainage in place, positive for sanguinous discharge    CARDIOVASCULAR   S1/S2.  RRR, negative for JVD  No peripheral edema    GASTROINTESTINAL:  Abdomen soft, non-tender, non-distended, no wounds or discoloration    Neurology: right arm contracted with minimal movement, limited motion of the right lower ext. Negative for pupillary changes      MUSCULOSKELETAL:  Extremities warm, pink, well-perfused.  Palpable/Doppler pulses signals      DERM:  stage 4 decubitus ulcer    :   Exam: negative for Kelly placement, IV fistula positive for bruit, and thrilled      Tubes/Lines/Drains   ----------------------------------------------------------------------------------------------------------  [x] Peripheral IV  [] Central Venous Line    R/L        IJ/Femoral             Date Placed:    [] Arterial Line		   R/L         Radial/Femoral    Date Placed:   [] PICC:         	[] Midline		                                  Date Placed:   [] Urinary Catheter Kelly                                             Date Placed:       LABS  --------------------------------------------------------------------------------------  LFTs      Cardiac Markers        Coagulation      Arterial Blood Gas  ABG - ( 03 Jun 2019 16:48 )  pH, Arterial: 7.38  pH, Blood: x     /  pCO2: 47    /  pO2: 165   / HCO3: 28    / Base Excess: 2.4   /  SaO2: 99                  Blood Sugar  CAPILLARY BLOOD GLUCOSE      POCT Blood Glucose.: 102 mg/dL (03 Jun 2019 10:10)      Urinalysis      Cultures            CT/XRAY/ECHO/TCD/EEG  ----------------------------------------------------------------------------------------------          --------------------------------------------------------------------------------------  Admit Diagnosis: E21.2/14545     Critical Care Diagnoses: SICU Consultation Note  ======================================================================================================  GIL GARCIA  MRN-[id number]    64 y.o F with past medical hx as below, presents  S/P parathyroidectomy secondary to tertiary hyperparathyroidism Prior to surgery patient experiencing throat discomfort and dysphagia,  tertiary hyperparathyroidism likely secondary to ESRD.  Intraoperative findings 4 gland with hyperplasia, RT inferior, left inferior and left superior glans removed after frozen section confirmation.  Course otherwise uncomplicated. PT extubated transferred to PACU in stable and guarded conditions. SICU consulted for close monitoring in the setting of past medical hx of multiple comorbidities, and close monitoring of possible electrolyte imbalances.       Procedure:  OR time: 1hr 56min     EBL: 10cc          IV Fluids: 800cc       Blood Products: N/A                UOP: N/A          PAST MEDICAL & SURGICAL HISTORY:  Atrial fibrillation  Murmur  Peripheral neuropathy  Seizures: 2009, per pt none since  Diabetes  ESRD (end stage renal disease) on dialysis: tues, th,sa  Stroke: 2009, 2013  HTN (hypertension)  H/O umbilical hernia repair  H/O tubal ligation  S/P arteriovenous (AV) fistula creation      Home Meds:   Home Medications:  Colace 100 mg oral capsule: 1 cap(s) orally 2 times a day (03 Jun 2019 10:11)  Eliquis 2.5 mg oral tablet: 1 tab(s) orally 2 times a day (03 Jun 2019 10:11)  gabapentin 100 mg oral tablet: orally 3 times a day (03 Jun 2019 10:11)  Keppra 250 mg oral tablet: 1 tab(s) orally 2 times a day (03 Jun 2019 10:11)  Lantus 100 units/mL subcutaneous solution: 10 unit(s) subcutaneous once a day (at bedtime) (03 Jun 2019 10:11)  Metoprolol Tartrate 25 mg oral tablet: 1 tab(s) orally 2 times a day (03 Jun 2019 10:11)  Norvasc 5 mg oral tablet: 1 tab(s) orally once a day (03 Jun 2019 10:11)  NovoLOG 100 units/mL subcutaneous solution: sliding scale , ac (03 Jun 2019 10:11)  raNITIdine 150 mg oral tablet: orally once a day (03 Jun 2019 10:11)  sevelamer hydrochloride 800 mg oral tablet: 2 tab(s) orally 3 times a day (03 Jun 2019 10:11)      Allergies    No Known Allergies    Intolerances          Advanced Directives: Full Code      CURRENT MEDICATIONS:   acetaminophen   Tablet .. 650 milliGRAM(s) Oral every 4 hours PRN  amLODIPine   Tablet 5 milliGRAM(s) Oral daily  calcitriol   Capsule 0.5 MICROGram(s) Oral every 12 hours  docusate sodium 100 milliGRAM(s) Oral two times a day  gabapentin 100 milliGRAM(s) Oral three times a day  insulin glargine Injectable (LANTUS) 10 Unit(s) SubCutaneous at bedtime  levETIRAcetam 250 milliGRAM(s) Oral two times a day  metoprolol tartrate 25 milliGRAM(s) Oral two times a day  oxyCODONE    5 mG/acetaminophen 325 mG 1 Tablet(s) Oral every 4 hours PRN  oxyCODONE    5 mG/acetaminophen 325 mG 2 Tablet(s) Oral every 4 hours PRN  sodium chloride 0.9% lock flush 3 milliLiter(s) IV Push every 8 hours  sodium chloride 0.9%. 1000 milliLiter(s) (50 mL/Hr) IV Continuous <Continuous>    VITAL SIGNS, INS/OUTS (Last 24hours):    ICU Vital Signs Last 24 Hrs  T(C): 36.4 (03 Jun 2019 16:23), Max: 36.6 (03 Jun 2019 10:14)  T(F): 97.6 (03 Jun 2019 16:23), Max: 97.8 (03 Jun 2019 10:14)  HR: 78 (03 Jun 2019 16:38) (70 - 82)  BP: 155/66 (03 Jun 2019 16:38) (145/76 - 155/66)  BP(mean): --  ABP: 166/70 (03 Jun 2019 16:38) (158/62 - 166/70)  ABP(mean): 112 (03 Jun 2019 16:38) (106 - 112)  RR: 11 (03 Jun 2019 16:38) (11 - 18)  SpO2: 95% (03 Jun 2019 16:38) (95% - 96%)    I&O's Summary    Height (cm): 162.56 (06-03-19)  Weight (kg): 62.6 (06-03-19)  BMI (kg/m2): 23.7 (06-03-19)  BSA (m2): 1.67 (06-03-19)    Physical Exam: Conducted Dr. Lico Cruz  ---------------------------------------------------------------------------------------  RASS:   GCS:    E/M/V  Exam: A&Ox3, no focal deficits    RESPIRATORY:  Lungs clear to auscultation b/l, Normal exp, negative for stridor and rales.     ENT: normal voice sounds, negative for stridor or signs of obstruction. no blood seen in the pharynx.    Neck: wound dressing on right side of the neck, clean and dry. Drainage in place, positive for sanguinous discharge    CARDIOVASCULAR   S1/S2.  RRR, negative for JVD  No peripheral edema    GASTROINTESTINAL:  Abdomen soft, non-tender, non-distended, no wounds or discoloration    Neurology: right arm contracted with minimal movement, limited motion of the right lower ext. Negative for pupillary changes      MUSCULOSKELETAL:  Extremities warm, pink, well-perfused.  Palpable/Doppler pulses signals      DERM:  stage 4 decubitus ulcer    :   Exam: negative for Kelly placement, IV fistula positive for bruit, and thrilled      Tybes/lines/drains  - R radial janna  - PIVs      Arterial Blood Gas  ABG - ( 03 Jun 2019 16:48 )  pH, Arterial: 7.38  pH, Blood: x     /  pCO2: 47    /  pO2: 165   / HCO3: 28    / Base Excess: 2.4   /  SaO2: 99

## 2019-06-03 NOTE — CONSULT NOTE ADULT - ASSESSMENT
64/F with ESRD on HD TTS, tertiary hyperparathyroidism, CVA, seizures, IDDM, HTN admitted for parathyroidectomy.    s/p parathyroidectomy -3/4 glands removed  - monitor for hypocalcemia and hungry bone syndrome  - post ABG lytes - K 3.9 Ca 1.17 mmol/L  - cont to monitor serum calcium and albumin or ionized Ca, magnesium and phosphorus every 4-6 hrs  - if ionized Ca<1.0 mmol/L or corrected Ca<8.5 or any signs of tetany - arrythmia, seizures, start Calcium infusion - D5W with 10 amps of Calcium gluconate (10%10ml each) at 50 cc/hr. Titrate to Ca corrected 7.5 - 8.5 mg%  or ionized 1.0-1.4 mmol/l  - start calcitriol 0.5 mcg po bid  - start Ca Carbonate 1000 mg q 8 hrs (when pt able to swallow)  - no phosphate binders sevelamer d/theodore)  - monitor EKG -QT interval  - post op PTH  US thyroid/[parathyroid noted (5/22) thyroid nodule - will need a bx    ESRD - HD due tomorrow  -renal low K diet    Seizures - cont current meds    DM - monitor FS, on Insulin    D/W ICU ENT team    Will follwo

## 2019-06-03 NOTE — BRIEF OPERATIVE NOTE - OPERATION/FINDINGS
4 gland hyperplasia, rt inferior, left inferior and left superior were removed after frozen section confirmation

## 2019-06-03 NOTE — CHART NOTE - NSCHARTNOTEFT_GEN_A_CORE
PACU ANESTHESIA ADMISSION NOTE      Procedure: Parathyroidectomy    Post op diagnosis:  Tertiary hyperparathyroidism      ____  Intubated  TV:______       Rate: ______      FiO2: ______    __x__  Patent Airway    ____  Full return of protective reflexes    ____  Full recovery from anesthesia / back to baseline     Vitals:   T:  98         R:    12              BP:         151/68          Sat:     95%               P:  85      Mental Status:  ___x_ Awake   _____ Alert   _____ Drowsy   _____ Sedated    Nausea/Vomiting:  __x__ NO  ______Yes,   See Post - Op Orders          Pain Scale (0-10):  _____    Treatment: ____ None    __x__ See Post - Op/PCA Orders    Post - Operative Fluids:   ____ Oral   __x__ See Post - Op Orders    Plan: Discharge:   ____Home       _____Floor     ____x_Critical Care    _____  Other:_________________    Comments: Uneventful intraoperative course. No anesthesia issues or complications noted.  Patient stable upon arrival to PACU. Report given to RN. Discharge when criteria met.

## 2019-06-03 NOTE — PROGRESS NOTE ADULT - SUBJECTIVE AND OBJECTIVE BOX
Pt is a 64 y.o female with h/o tertiary hyperparathyroidism s/p 3 gland (R lower, L lower, L upper) parathyroidectomy, POD #0, seen and examined at bedside. Pt doing well - complaining of some neck and throat pain. Pt denies any numbness/tingling to her hands/feet/mouth. No SOB/diff breathing.    Vital Signs: T(F): 97.8 (03 Jun 2019 17:23), Max: 97.8 (03 Jun 2019 10:14), HR: 76, BP: 144/75, RR: 12, SpO2: 99%  GEN: NAD, awake and alert. no drooling or pooling of secretions, no stridor/stertor noted. no increased respiratory effort.  HEENT: + neck incision noted, area C/D/I. no overlying erythema/edema, no hematoma palpated. steri strips intact. - chovstek B/L.     Hemovac in place, draining serosang/bloody fluid, minimal.    LABS:    141  |  100  |  43<H>  ----------------------------<  128<H>  4.1   |  25  |  6.5<HH>    Ca    9.2      Mg     2.1     TPro  x   /  Alb  3.3<L>  /  TBili  x   /  DBili  x   /  AST  x   /  ALT  x   /  AlkPhos  x   06-03    ABG - ( 03 Jun 2019 16:48 )  pH, Arterial: 7.38  pH, Blood: x     /  pCO2: 47    /  pO2: 165   / HCO3: 28    / Base Excess: 2.4   /  SaO2: 99        iCa 1.18    EKG done in RR

## 2019-06-03 NOTE — CONSULT NOTE ADULT - ASSESSMENT
Assessment & Plan    64y Female  s/p parathyroidectomy for tertiary hyperparathyroidism     NEURO:   Post op pain control:  -tylenol , oxycodone PRN     TBI/seizure: with residual right sided contraction  - continue home Keppra    Neuropathy:  -continue gabapentin    RESP:   - No acute diagnosis  - Nasal Cannula as needed, maintain O saturation above 94%.  - IS/PT  - follow post op chest x ray    CARDS:  History of Afib, HTN  - restarted on home amlodipine, metoprolol  - holding home eliquis, will restart per primary team  - follow up post op ECG , due to concern for possible QTC prolongation  - Follow up cardiac enzymes  - maintain MAP above 65      GI/NUTR:   Diet:  - Clear renal diet, will advance as tolerated     Prophylaxis:  -PPI  - Bowel regimen      /RENAL:   ESRD:  - dialysis on T,Th,S, access via left arm AV fistula  -IV fluids N/S @ 50, IV lock once patient is tolerating diet    - F/u post op labs, replete as needed  - No Kelly ***    HEME/ONC:   - No acute diagnosis  - Heparin S/Q, SCD  - follow up post op labs    ID:   -No acute diagnosis  - follow up labs      ENDO:  S/P parathyroidectomy:   - DO NOT START PATIENT ON BINDERS, due to concerns of hungry bone syndrome  -follow up PTH  - trend calcium/albumin/mag/phos Q 6 hrs  -maintain corrected calcium range between 7.5-9  - If corrected Calcium is 8 or below, infuse with D5W, 10 amps of calcium gluconate at 50cc/hr, adjust by increments of 10cc as needed  - Dr. Horne [phone]    LINES/DRAINS: Genie TURCIOS Foley     DISPO: SICU d/w Dr. Real Assessment & Plan    64y Female  s/p parathyroidectomy for tertiary hyperparathyroidism     NEURO:   Post op pain control:  -tylenol , oxycodone PRN     TBI/seizure: with residual right sided contraction  - continue home Keppra    Neuropathy:  -continue gabapentin    RESP:   - No acute diagnosis  - Nasal Cannula as needed, maintain O saturation above 94%.  - IS/PT  - follow post op chest x ray    CARDS:  History of Afib, HTN  - restarted on home amlodipine, metoprolol  - holding home eliquis, will restart per primary team  - follow up post op ECG , due to concern for possible QTC prolongation  - Follow up cardiac enzymes  - maintain MAP above 65      GI/NUTR:   Diet:  - Clear renal diet, will advance as tolerated     Prophylaxis:  -PPI  - Bowel regimen      /RENAL:   ESRD:  - dialysis on T,Th,S, access via left arm AV fistula  -IV fluids N/S @ 50, IV lock once patient is tolerating diet    - F/u post op labs, replete as needed  - No Kelly ***    HEME/ONC:   - No acute diagnosis  - Heparin S/Q, SCD  - follow up post op labs    ID:   -No acute diagnosis  - follow up labs      ENDO:  S/P parathyroidectomy:   - DO NOT START PATIENT ON BINDERS, due to concerns of hungry bone syndrome  -follow up PTH  - trend calcium/albumin/mag/phos Q 6 hrs  -maintain corrected calcium range between 7.5-9  - If corrected Calcium is 8 or below, infuse with D5W, 10 amps of calcium gluconate at 50cc/hr, adjust by increments of 10cc as needed  - Dr. Horne [phone]    Diabetes:  - Home lantus, ISS        LINES/DRAINS: Genie TURCIOS Foley     DISPO: SICU d/w Dr. Real Assessment & Plan    64y Female  s/p parathyroidectomy for tertiary hyperparathyroidism     NEURO:   Post op pain control:  -tylenol , oxycodone PRN     TBI/seizure: with residual right sided contraction  - continue home Keppra    Neuropathy:  -continue gabapentin    RESP:   - No acute diagnosis  - Nasal Cannula as needed, maintain O saturation above 94%.  - IS/PT  - follow post op chest x ray    CARDS:  History of Afib, HTN  - restarted on home amlodipine, metoprolol  - holding home eliquis, will restart per primary team  - follow up post op ECG , due to concern for possible QTC prolongation  - Follow up cardiac enzymes  - maintain MAP above 65      GI/NUTR:   Diet:  - Clear renal diet, will advance as tolerated     Prophylaxis:  -PPI  - Bowel regimen      /RENAL:   ESRD:  - dialysis on T,Th,S, access via left arm AV fistula  -IV fluids N/S @ 50, IV lock once patient is tolerating diet    - F/u post op labs, replete as needed  - No Kelly ***    HEME/ONC:   - No acute diagnosis  - Heparin S/Q, SCD  - follow up post op labs    ID:   -No acute diagnosis  - follow up labs      ENDO:  S/P parathyroidectomy:   - DO NOT START PATIENT ON BINDERS, due to concerns of hungry bone syndrome  -follow up PTH  - trend calcium/albumin/mag/phos Q 6 hrs  - Ca carbonate 500mg BID when pt able to swallow   -maintain corrected calcium range between 7.5-9  - If corrected Calcium is 8 or below, infuse with D5W, 10 amps of calcium gluconate at 50cc/hr, adjust by increments of 10cc as needed  - Dr. Horne [phone]    Diabetes:  - Home lantus, ISS        LINES/DRAINS: Genie TURCIOS Foley     DISPO: SICU d/w Dr. Real Assessment & Plan    64y Female  s/p parathyroidectomy for tertiary hyperparathyroidism     NEURO:   Post op pain control:  -Tylenol , oxycodone PRN     TBI/seizure: (2009?)  with residual right sided contraction  - continue home Keppra    Neuropathy:  -continue gabapentin    RESP:   - No acute diagnosis  - Nasal Cannula as needed, maintain O saturation above 94%.  - IS/PT  - follow post op chest x ray    CARDS:  History of Afib, HTN  - restarted on home amlodipine, metoprolol  - holding home eliquis, will restart per primary team  - follow up post op ECG , due to concern for possible QTC prolongation  - Follow up cardiac enzymes  - maintain MAP above 65      GI/NUTR:   Diet:  - Clear renal diet, will advance as tolerated     Prophylaxis:  -PPI  - Bowel regimen      /RENAL:   ESRD:  - dialysis on T,Th,S, access via left arm AV fistula- Pt not aware of nephrologists name   - IV fluids N/S @ 50, IV lock once patient is tolerating diet  - No brar, per patient she voids once a week    - F/u post op labs, replete as needed      HEME/ONC:   - No acute diagnosis  - Heparin S/Q, SCD  - follow up post op labs    ID:   -No acute diagnosis  - follow up labs      ENDO:  S/P parathyroidectomy: 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 Horne [phone]    Diabetes:  - Home lantus, ISS          LINES/DRAINS: Genie TURCIOS Foley     DISPO: SICU d/w Dr. Real Assessment & Plan    64y Female  s/p parathyroidectomy for tertiary hyperparathyroidism     NEURO:   Post op pain control:  -Tylenol , oxycodone PRN     TBI/seizure: (2009?)  with residual right sided contraction  - continue home Keppra    Neuropathy:  -continue gabapentin    RESP:   - No acute diagnosis  - Nasal Cannula as needed, maintain O saturation above 94%.  - IS/PT  - follow post op chest x ray    CARDS:  History of Afib, HTN  - restarted on home amlodipine, metoprolol  - holding home eliquis, will restart per primary team  - follow up post op ECG , due to concern for possible QTC prolongation  - Follow up cardiac enzymes  - maintain MAP above 65    GI/NUTR:   Diet:  - Clear renal diet, will advance as tolerated     Prophylaxis:  -PPI  - Bowel regimen    /RENAL:   ESRD:  - dialysis on T,Th,S, access via left arm AV fistula- Pt not aware of nephrologists name   - IV fluids N/S @ 50, IV lock once patient is tolerating diet  - No brar, per patient she voids once a week  - F/u post op labs, replete as needed      HEME/ONC:   - No acute diagnosis  - Heparin S/Q, SCD  - follow up post op labs    ID:   -No acute diagnosis  - follow up labs    ENDO:  S/P parathyroidectomy: 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 Horne [phone]    Diabetes:  - Home lantus, ISS          LINES/DRAINS: Genie TURCIOS Foley     DISPO: SICU d/w Dr. Real, consult done by Dr. Cruz & ELADIA Campbell

## 2019-06-03 NOTE — CONSULT NOTE ADULT - SUBJECTIVE AND OBJECTIVE BOX
NEPHROLOGY CONSULTATION NOTE    Patient is a 64y Female whom presented to the hospital for elective parathyroidectomy for tertiary hyperparathyroidism.  Pt with ESRD on HD TTS, IDDM, HTN, CVA, seizures, HPT (hyperparathyroidism). iPTH 2000 in April 2019. Last HD 6/1/19.  Lying comfortably in bed. Pox 96% on NC    PAST MEDICAL & SURGICAL HISTORY:  Atrial fibrillation  Murmur  Peripheral neuropathy  Seizures: 2009, per pt none since  Diabetes  ESRD (end stage renal disease) on dialysis: tues, th,sa  Stroke: 2009, 2013  HTN (hypertension)  H/O umbilical hernia repair  H/O tubal ligation  S/P arteriovenous (AV) fistula creation    Allergies:  No Known Allergies    Home Medications Reviewed  Hospital Medications:   MEDICATIONS  (STANDING):  amLODIPine   Tablet 5 milliGRAM(s) Oral daily  calcitriol   Capsule 0.5 MICROGram(s) Oral every 12 hours  docusate sodium 100 milliGRAM(s) Oral two times a day  gabapentin 100 milliGRAM(s) Oral three times a day  insulin glargine Injectable (LANTUS) 10 Unit(s) SubCutaneous at bedtime  levETIRAcetam 250 milliGRAM(s) Oral two times a day  metoprolol tartrate 25 milliGRAM(s) Oral two times a day  sodium chloride 0.9% lock flush 3 milliLiter(s) IV Push every 8 hours  sodium chloride 0.9%. 1000 milliLiter(s) (50 mL/Hr) IV Continuous <Continuous>      SOCIAL HISTORY:  Denies ETOH,Smoking,   FAMILY HISTORY:        REVIEW OF SYSTEMS:  Unable to obtain. Pt just had sx, still lethargic.  VASCULAR: No bilateral lower extremity edema.   All other review of systems is negative unless indicated above.    VITALS:  T(F): 97.6 (06-03-19 @ 16:23), Max: 97.8 (06-03-19 @ 10:14)  HR: 82 (06-03-19 @ 16:53)  BP: 149/63 (06-03-19 @ 16:53)  RR: 11 (06-03-19 @ 16:53)  SpO2: 100% (06-03-19 @ 16:53)    Height (cm): 162.56 (06-03 @ 12:54)  Weight (kg): 62.6 (06-03 @ 12:54)  BMI (kg/m2): 23.7 (06-03 @ 12:54)  BSA (m2): 1.67 (06-03 @ 12:54)    I&O's Detail        PHYSICAL EXAM:  Constitutional: NAD  HEENT: anicteric sclera  Neck: No JVD  Respiratory: CTA, decreased at bases  Cardiovascular: S1, S2, RRR  Gastrointestinal: BS+, soft, NT/ND  Extremities: No peripheral edema  Neurological: Lethargic, arousable  : No CVA tenderness. No brar.   Skin: No rashes  Vascular Access: AVF Lt arm    LABS:        Creatinine Trend:     Urine Studies:              RADIOLOGY & ADDITIONAL STUDIES:    < from: US Thyroid + Parathyroid (05.22.19 @ 10:33) >  Technique: Thyroid sonogram.    Comparison: None.    Findings:    Echotexture: Homogeneous.    Thyroid size:    Right lobe: 4.8 x 1.5 x 1.7 cm  Left lobe: 4.9 x 1.7 x 1.1 cm  Isthmus: 0.4 cm    Vascularity: Normal.    Nodule number: 1  Size: 1.5 x 0.8 x 0.5 cm; Location: left; upper/mid pole.    Composition: solid/almost completely solid (2)  Echogenicity: very hypoechoic (3)  Shape: wider-than-tall (0)  Margins: smooth (0)  Echogenic foci: none (0)    ACR TI-RADS risk category: TR4 (4-6 points)    Nodule number: 2  Size: 0.4 x 0.5 x 0.3 cm; Location: left; mid pole.    Composition: mixed cystic andsolid (1)  Echogenicity: hypoechoic (2)  Shape: wider-than-tall (0)  Margins: smooth (0)  Echogenic foci: none (0)    ACR TI-RADS risk category: TR3 (3 points)    Other: Few prominent adjacent lymph nodes. The largest lymph node   inferior to lower pole of the left thyroid lobe measures 1.3 x 1.5 x 0.5   cm with color Doppler flow demonstrated in the very hyperechoic cortex. A   similar-appearing lymph node is also seen inferior to the right lower   pole measuring 1.4 x 0.7 x 0.9 cm.     Impression:   1.  Moderately suspicious 1.5 x 0.8 x 0.5 cm solid nodule in the left   upper to midpole. TR 4 - FNA if = 1.5cm, follow-up if 1 - 1.4 cm.   Recommendation: Ultrasound-guided fine-needle aspiration.    < end of copied text >  < from: Xray Chest 2 Views PA/Lat (05.20.19 @ 13:39) >  Low lung volumes. Bibasilar atelectasis.    < end of copied text >

## 2019-06-04 LAB
ALBUMIN SERPL ELPH-MCNC: 2.4 G/DL — LOW (ref 3.5–5.2)
ALBUMIN SERPL ELPH-MCNC: 3 G/DL — LOW (ref 3.5–5.2)
ALBUMIN SERPL ELPH-MCNC: 3.1 G/DL — LOW (ref 3.5–5.2)
ALP SERPL-CCNC: 633 U/L — HIGH (ref 30–115)
ALT FLD-CCNC: <5 U/L — SIGNIFICANT CHANGE UP (ref 0–41)
ANION GAP SERPL CALC-SCNC: 14 MMOL/L — SIGNIFICANT CHANGE UP (ref 7–14)
ANION GAP SERPL CALC-SCNC: 15 MMOL/L — HIGH (ref 7–14)
AST SERPL-CCNC: 11 U/L — SIGNIFICANT CHANGE UP (ref 0–41)
BASOPHILS # BLD AUTO: 0.02 K/UL — SIGNIFICANT CHANGE UP (ref 0–0.2)
BASOPHILS NFR BLD AUTO: 0.3 % — SIGNIFICANT CHANGE UP (ref 0–1)
BILIRUB SERPL-MCNC: 0.3 MG/DL — SIGNIFICANT CHANGE UP (ref 0.2–1.2)
BUN SERPL-MCNC: 20 MG/DL — SIGNIFICANT CHANGE UP (ref 10–20)
BUN SERPL-MCNC: 36 MG/DL — HIGH (ref 10–20)
BUN SERPL-MCNC: 37 MG/DL — HIGH (ref 10–20)
BUN SERPL-MCNC: 45 MG/DL — HIGH (ref 10–20)
CALCIUM SERPL-MCNC: 5.5 MG/DL — CRITICAL LOW (ref 8.5–10.1)
CALCIUM SERPL-MCNC: 7 MG/DL — LOW (ref 8.5–10.1)
CALCIUM SERPL-MCNC: 7.2 MG/DL — LOW (ref 8.5–10.1)
CALCIUM SERPL-MCNC: 7.2 MG/DL — LOW (ref 8.5–10.1)
CALCIUM SERPL-MCNC: 7.4 MG/DL — LOW (ref 8.4–10.5)
CHLORIDE SERPL-SCNC: 110 MMOL/L — SIGNIFICANT CHANGE UP (ref 98–110)
CHLORIDE SERPL-SCNC: 95 MMOL/L — LOW (ref 98–110)
CHLORIDE SERPL-SCNC: 96 MMOL/L — LOW (ref 98–110)
CHLORIDE SERPL-SCNC: 98 MMOL/L — SIGNIFICANT CHANGE UP (ref 98–110)
CO2 SERPL-SCNC: 19 MMOL/L — SIGNIFICANT CHANGE UP (ref 17–32)
CO2 SERPL-SCNC: 25 MMOL/L — SIGNIFICANT CHANGE UP (ref 17–32)
CO2 SERPL-SCNC: 26 MMOL/L — SIGNIFICANT CHANGE UP (ref 17–32)
CO2 SERPL-SCNC: 29 MMOL/L — SIGNIFICANT CHANGE UP (ref 17–32)
CREAT SERPL-MCNC: 4.1 MG/DL — CRITICAL HIGH (ref 0.7–1.5)
CREAT SERPL-MCNC: 5.5 MG/DL — CRITICAL HIGH (ref 0.7–1.5)
CREAT SERPL-MCNC: 5.8 MG/DL — CRITICAL HIGH (ref 0.7–1.5)
CREAT SERPL-MCNC: 6.6 MG/DL — CRITICAL HIGH (ref 0.7–1.5)
EOSINOPHIL # BLD AUTO: 0.14 K/UL — SIGNIFICANT CHANGE UP (ref 0–0.7)
EOSINOPHIL NFR BLD AUTO: 2.3 % — SIGNIFICANT CHANGE UP (ref 0–8)
GAS PNL BLDA: SIGNIFICANT CHANGE UP
GLUCOSE BLDC GLUCOMTR-MCNC: 126 MG/DL — HIGH (ref 70–99)
GLUCOSE BLDC GLUCOMTR-MCNC: 135 MG/DL — HIGH (ref 70–99)
GLUCOSE BLDC GLUCOMTR-MCNC: 157 MG/DL — HIGH (ref 70–99)
GLUCOSE BLDC GLUCOMTR-MCNC: 160 MG/DL — HIGH (ref 70–99)
GLUCOSE SERPL-MCNC: 105 MG/DL — HIGH (ref 70–99)
GLUCOSE SERPL-MCNC: 132 MG/DL — HIGH (ref 70–99)
GLUCOSE SERPL-MCNC: 160 MG/DL — HIGH (ref 70–99)
GLUCOSE SERPL-MCNC: 172 MG/DL — HIGH (ref 70–99)
HCT VFR BLD CALC: 24 % — LOW (ref 37–47)
HCT VFR BLD CALC: 28 % — LOW (ref 37–47)
HGB BLD-MCNC: 7.6 G/DL — LOW (ref 12–16)
HGB BLD-MCNC: 8.9 G/DL — LOW (ref 12–16)
IMM GRANULOCYTES NFR BLD AUTO: 0.2 % — SIGNIFICANT CHANGE UP (ref 0.1–0.3)
IRON SATN MFR SERPL: 22 % — SIGNIFICANT CHANGE UP (ref 15–50)
IRON SATN MFR SERPL: 35 UG/DL — SIGNIFICANT CHANGE UP (ref 35–150)
LYMPHOCYTES # BLD AUTO: 1.01 K/UL — LOW (ref 1.2–3.4)
LYMPHOCYTES # BLD AUTO: 16.9 % — LOW (ref 20.5–51.1)
MAGNESIUM SERPL-MCNC: 1.6 MG/DL — LOW (ref 1.8–2.4)
MAGNESIUM SERPL-MCNC: 2 MG/DL — SIGNIFICANT CHANGE UP (ref 1.8–2.4)
MAGNESIUM SERPL-MCNC: 2.1 MG/DL — SIGNIFICANT CHANGE UP (ref 1.8–2.4)
MCHC RBC-ENTMCNC: 30.1 PG — SIGNIFICANT CHANGE UP (ref 27–31)
MCHC RBC-ENTMCNC: 30.5 PG — SIGNIFICANT CHANGE UP (ref 27–31)
MCHC RBC-ENTMCNC: 31.7 G/DL — LOW (ref 32–37)
MCHC RBC-ENTMCNC: 31.8 G/DL — LOW (ref 32–37)
MCV RBC AUTO: 94.6 FL — SIGNIFICANT CHANGE UP (ref 81–99)
MCV RBC AUTO: 96.4 FL — SIGNIFICANT CHANGE UP (ref 81–99)
MONOCYTES # BLD AUTO: 0.51 K/UL — SIGNIFICANT CHANGE UP (ref 0.1–0.6)
MONOCYTES NFR BLD AUTO: 8.5 % — SIGNIFICANT CHANGE UP (ref 1.7–9.3)
NEUTROPHILS # BLD AUTO: 4.28 K/UL — SIGNIFICANT CHANGE UP (ref 1.4–6.5)
NEUTROPHILS NFR BLD AUTO: 71.8 % — SIGNIFICANT CHANGE UP (ref 42.2–75.2)
NRBC # BLD: 0 /100 WBCS — SIGNIFICANT CHANGE UP (ref 0–0)
NRBC # BLD: 0 /100 WBCS — SIGNIFICANT CHANGE UP (ref 0–0)
PHOSPHATE SERPL-MCNC: 2.3 MG/DL — SIGNIFICANT CHANGE UP (ref 2.1–4.9)
PHOSPHATE SERPL-MCNC: 3.1 MG/DL — SIGNIFICANT CHANGE UP (ref 2.1–4.9)
PHOSPHATE SERPL-MCNC: 3.2 MG/DL — SIGNIFICANT CHANGE UP (ref 2.1–4.9)
PHOSPHATE SERPL-MCNC: 4.6 MG/DL — SIGNIFICANT CHANGE UP (ref 2.1–4.9)
PLATELET # BLD AUTO: 182 K/UL — SIGNIFICANT CHANGE UP (ref 130–400)
PLATELET # BLD AUTO: 189 K/UL — SIGNIFICANT CHANGE UP (ref 130–400)
POTASSIUM SERPL-MCNC: 3.6 MMOL/L — SIGNIFICANT CHANGE UP (ref 3.5–5)
POTASSIUM SERPL-MCNC: 3.8 MMOL/L — SIGNIFICANT CHANGE UP (ref 3.5–5)
POTASSIUM SERPL-MCNC: 4.4 MMOL/L — SIGNIFICANT CHANGE UP (ref 3.5–5)
POTASSIUM SERPL-MCNC: 4.6 MMOL/L — SIGNIFICANT CHANGE UP (ref 3.5–5)
POTASSIUM SERPL-SCNC: 3.6 MMOL/L — SIGNIFICANT CHANGE UP (ref 3.5–5)
POTASSIUM SERPL-SCNC: 3.8 MMOL/L — SIGNIFICANT CHANGE UP (ref 3.5–5)
POTASSIUM SERPL-SCNC: 4.4 MMOL/L — SIGNIFICANT CHANGE UP (ref 3.5–5)
POTASSIUM SERPL-SCNC: 4.6 MMOL/L — SIGNIFICANT CHANGE UP (ref 3.5–5)
PROT SERPL-MCNC: 6.5 G/DL — SIGNIFICANT CHANGE UP (ref 6–8)
PTH-INTACT FLD-MCNC: 256 PG/ML — HIGH (ref 15–65)
RBC # BLD: 2.49 M/UL — LOW (ref 4.2–5.4)
RBC # BLD: 2.96 M/UL — LOW (ref 4.2–5.4)
RBC # FLD: 14.4 % — SIGNIFICANT CHANGE UP (ref 11.5–14.5)
RBC # FLD: 16 % — HIGH (ref 11.5–14.5)
SODIUM SERPL-SCNC: 136 MMOL/L — SIGNIFICANT CHANGE UP (ref 135–146)
SODIUM SERPL-SCNC: 137 MMOL/L — SIGNIFICANT CHANGE UP (ref 135–146)
SODIUM SERPL-SCNC: 138 MMOL/L — SIGNIFICANT CHANGE UP (ref 135–146)
SODIUM SERPL-SCNC: 144 MMOL/L — SIGNIFICANT CHANGE UP (ref 135–146)
TIBC SERPL-MCNC: 162 UG/DL — LOW (ref 220–430)
UIBC SERPL-MCNC: 127 UG/DL — SIGNIFICANT CHANGE UP (ref 110–370)
WBC # BLD: 5.97 K/UL — SIGNIFICANT CHANGE UP (ref 4.8–10.8)
WBC # BLD: 5.97 K/UL — SIGNIFICANT CHANGE UP (ref 4.8–10.8)
WBC # FLD AUTO: 5.97 K/UL — SIGNIFICANT CHANGE UP (ref 4.8–10.8)
WBC # FLD AUTO: 5.97 K/UL — SIGNIFICANT CHANGE UP (ref 4.8–10.8)

## 2019-06-04 PROCEDURE — 93010 ELECTROCARDIOGRAM REPORT: CPT

## 2019-06-04 PROCEDURE — 99291 CRITICAL CARE FIRST HOUR: CPT

## 2019-06-04 RX ORDER — SODIUM CHLORIDE 9 MG/ML
1000 INJECTION, SOLUTION INTRAVENOUS
Refills: 0 | Status: DISCONTINUED | OUTPATIENT
Start: 2019-06-04 | End: 2019-06-05

## 2019-06-04 RX ORDER — DARBEPOETIN ALFA IN POLYSORBAT 200MCG/0.4
40 PEN INJECTOR (ML) SUBCUTANEOUS
Refills: 0 | Status: DISCONTINUED | OUTPATIENT
Start: 2019-06-04 | End: 2019-06-09

## 2019-06-04 RX ORDER — ACETAMINOPHEN 500 MG
650 TABLET ORAL EVERY 6 HOURS
Refills: 0 | Status: DISCONTINUED | OUTPATIENT
Start: 2019-06-04 | End: 2019-06-06

## 2019-06-04 RX ORDER — CHLORHEXIDINE GLUCONATE 213 G/1000ML
1 SOLUTION TOPICAL
Refills: 0 | Status: DISCONTINUED | OUTPATIENT
Start: 2019-06-04 | End: 2019-06-09

## 2019-06-04 RX ORDER — CALCITRIOL 0.5 UG/1
1 CAPSULE ORAL EVERY 12 HOURS
Refills: 0 | Status: DISCONTINUED | OUTPATIENT
Start: 2019-06-04 | End: 2019-06-09

## 2019-06-04 RX ORDER — SODIUM CHLORIDE 9 MG/ML
1000 INJECTION, SOLUTION INTRAVENOUS
Refills: 0 | Status: DISCONTINUED | OUTPATIENT
Start: 2019-06-04 | End: 2019-06-04

## 2019-06-04 RX ORDER — CALCIUM CARBONATE 500(1250)
2 TABLET ORAL EVERY 4 HOURS
Refills: 0 | Status: DISCONTINUED | OUTPATIENT
Start: 2019-06-04 | End: 2019-06-09

## 2019-06-04 RX ORDER — MAGNESIUM SULFATE 500 MG/ML
2 VIAL (ML) INJECTION ONCE
Refills: 0 | Status: COMPLETED | OUTPATIENT
Start: 2019-06-04 | End: 2019-06-04

## 2019-06-04 RX ADMIN — Medication 650 MILLIGRAM(S): at 14:51

## 2019-06-04 RX ADMIN — GABAPENTIN 100 MILLIGRAM(S): 400 CAPSULE ORAL at 05:42

## 2019-06-04 RX ADMIN — SODIUM CHLORIDE 3 MILLILITER(S): 9 INJECTION INTRAMUSCULAR; INTRAVENOUS; SUBCUTANEOUS at 21:59

## 2019-06-04 RX ADMIN — Medication 25 MILLIGRAM(S): at 17:24

## 2019-06-04 RX ADMIN — Medication 100 MILLIGRAM(S): at 05:43

## 2019-06-04 RX ADMIN — Medication 650 MILLIGRAM(S): at 13:40

## 2019-06-04 RX ADMIN — CALCITRIOL 1 MICROGRAM(S): 0.5 CAPSULE ORAL at 17:24

## 2019-06-04 RX ADMIN — INSULIN GLARGINE 10 UNIT(S): 100 INJECTION, SOLUTION SUBCUTANEOUS at 22:00

## 2019-06-04 RX ADMIN — SENNA PLUS 1 TABLET(S): 8.6 TABLET ORAL at 21:28

## 2019-06-04 RX ADMIN — LEVETIRACETAM 250 MILLIGRAM(S): 250 TABLET, FILM COATED ORAL at 05:43

## 2019-06-04 RX ADMIN — HEPARIN SODIUM 5000 UNIT(S): 5000 INJECTION INTRAVENOUS; SUBCUTANEOUS at 13:41

## 2019-06-04 RX ADMIN — HEPARIN SODIUM 5000 UNIT(S): 5000 INJECTION INTRAVENOUS; SUBCUTANEOUS at 05:42

## 2019-06-04 RX ADMIN — Medication 25 GRAM(S): at 11:08

## 2019-06-04 RX ADMIN — SODIUM CHLORIDE 50 MILLILITER(S): 9 INJECTION, SOLUTION INTRAVENOUS at 03:06

## 2019-06-04 RX ADMIN — GABAPENTIN 100 MILLIGRAM(S): 400 CAPSULE ORAL at 21:27

## 2019-06-04 RX ADMIN — Medication 2 TABLET(S): at 13:41

## 2019-06-04 RX ADMIN — SODIUM CHLORIDE 3 MILLILITER(S): 9 INJECTION INTRAMUSCULAR; INTRAVENOUS; SUBCUTANEOUS at 05:42

## 2019-06-04 RX ADMIN — Medication 650 MILLIGRAM(S): at 17:27

## 2019-06-04 RX ADMIN — CALCITRIOL 0.5 MICROGRAM(S): 0.5 CAPSULE ORAL at 05:42

## 2019-06-04 RX ADMIN — LEVETIRACETAM 250 MILLIGRAM(S): 250 TABLET, FILM COATED ORAL at 17:24

## 2019-06-04 RX ADMIN — PANTOPRAZOLE SODIUM 40 MILLIGRAM(S): 20 TABLET, DELAYED RELEASE ORAL at 21:27

## 2019-06-04 RX ADMIN — Medication 2 TABLET(S): at 21:27

## 2019-06-04 RX ADMIN — Medication 40 MICROGRAM(S): at 12:53

## 2019-06-04 RX ADMIN — Medication 2 TABLET(S): at 17:24

## 2019-06-04 RX ADMIN — HEPARIN SODIUM 5000 UNIT(S): 5000 INJECTION INTRAVENOUS; SUBCUTANEOUS at 21:27

## 2019-06-04 RX ADMIN — Medication 100 MILLIGRAM(S): at 17:24

## 2019-06-04 RX ADMIN — SODIUM CHLORIDE 3 MILLILITER(S): 9 INJECTION INTRAMUSCULAR; INTRAVENOUS; SUBCUTANEOUS at 14:51

## 2019-06-04 RX ADMIN — Medication 2 TABLET(S): at 05:42

## 2019-06-04 RX ADMIN — GABAPENTIN 100 MILLIGRAM(S): 400 CAPSULE ORAL at 13:41

## 2019-06-04 NOTE — PROGRESS NOTE ADULT - SUBJECTIVE AND OBJECTIVE BOX
GIL GARCIA  [id number]  64y Female    Indication for ICU admission: s/p parathyroidectomy secondary to tertiary hyperparathyroidism, upgraded to SICU for close monitoring 2/2 multiple comorbotities and possible electrolyte derrangments     Admit Date: 6/3  ICU Date: 6/3  OR Date: 6/3    No Known Allergies    PAST MEDICAL & SURGICAL HISTORY:  Atrial fibrillation  Murmur  Peripheral neuropathy  Seizures: 2009, per pt none since  Diabetes  ESRD (end stage renal disease) on dialysis: tues, th,sa  Stroke: 2009, 2013  HTN (hypertension)  H/O umbilical hernia repair  H/O tubal ligation  S/P arteriovenous (AV) fistula creation    Home Medications:  Colace 100 mg oral capsule: 1 cap(s) orally 2 times a day (03 Jun 2019 10:11)  Eliquis 2.5 mg oral tablet: 1 tab(s) orally 2 times a day (03 Jun 2019 10:11)  gabapentin 100 mg oral tablet: orally 3 times a day (03 Jun 2019 10:11)  Keppra 250 mg oral tablet: 1 tab(s) orally 2 times a day (03 Jun 2019 10:11)  Lantus 100 units/mL subcutaneous solution: 10 unit(s) subcutaneous once a day (at bedtime) (03 Jun 2019 10:11)  Metoprolol Tartrate 25 mg oral tablet: 1 tab(s) orally 2 times a day (03 Jun 2019 10:11)  Norvasc 5 mg oral tablet: 1 tab(s) orally once a day (03 Jun 2019 10:11)  NovoLOG 100 units/mL subcutaneous solution: sliding scale , ac (03 Jun 2019 10:11)  raNITIdine 150 mg oral tablet: orally once a day (03 Jun 2019 10:11)  sevelamer hydrochloride 800 mg oral tablet: 2 tab(s) orally 3 times a day (03 Jun 2019 10:11)    24HRS EVENT:    64yFemale s/p parathyroidectomy for tertiary hyperparathyroidism     Overnight: Ionized 0.99, started on calcium gluconate drip at 50cc/hr, hypotensive SBP 90s, when sleeping, normotensive once awake. will be transfusing 1u PRBC for HB of 7.6. baseline approx. 9.     NEURO:   Post op pain control:  -Tylenol , oxycodone PRN     TBI/seizure: (2009?)  with residual right sided contraction  - continue home Keppra    Neuropathy:  -continue gabapentin    RESP:   - No acute diagnosis  - Nasal Cannula as needed, maintain O saturation above 94%.  - IS/PT  - follow post op chest x ray    CARDS:  History of Afib, HTN  - restarted on home amlodipine, metoprolol  - holding home eliquis, will restart per primary team  - follow up post op ECG , due to concern for possible QTC prolongation  - Follow up cardiac enzymes  - Maintain MAP above 65    GI/NUTR:   Diet:  - Clear renal diet, will advance as tolerated     Prophylaxis:  -PPI  - Bowel regimen    /RENAL:   ESRD:  - dialysis on T,Th,S, access via left arm AV fistula- Pt not aware of nephrologists name   - IV fluids N/S @ 50, IV lock once patient is tolerating diet  - No brar, per patient she voids once a week  - F/u post op labs, replete as needed    HEME/ONC:   - No acute diagnosis  - Heparin S/Q, SCD  -symptomatic anemia with hg 7.6 & hypotension > 1uPRBC, f/u CBC at 11am    ID:   -No acute diagnosis  - follow up labs    ENDO:  S/P parathyroidectomy: 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 Horne [phone]    Diabetes:  - Home lantus, ISS      DVT PTX:  Hep SQ/SCD  GI PTX: PPI    ***Tubes/Lines/Drains  ***  Peripheral IV  Right radial janna    REVIEW OF SYSTEMS    [x ] A ten-point review of systems was otherwise negative except as noted.  [ ] Due to altered mental status/intubation, subjective information were not able to be obtained from the patient. History was obtained, to the extent possible, from review of the chart and collateral sources of information. GIL GARCIA  [id number]  64y Female    Indication for ICU admission: s/p parathyroidectomy secondary to tertiary hyperparathyroidism, upgraded to SICU for close monitoring 2/2 multiple comorbotities and possible electrolyte derrangments     Admit Date: 6/3  ICU Date: 6/3  OR Date: 6/3    No Known Allergies    PAST MEDICAL & SURGICAL HISTORY:  Atrial fibrillation  Murmur  Peripheral neuropathy  Seizures: 2009, per pt none since  Diabetes  ESRD (end stage renal disease) on dialysis: tues, th,sa  Stroke: 2009, 2013  HTN (hypertension)  H/O umbilical hernia repair  H/O tubal ligation  S/P arteriovenous (AV) fistula creation    Home Medications:  Colace 100 mg oral capsule: 1 cap(s) orally 2 times a day (03 Jun 2019 10:11)  Eliquis 2.5 mg oral tablet: 1 tab(s) orally 2 times a day (03 Jun 2019 10:11)  gabapentin 100 mg oral tablet: orally 3 times a day (03 Jun 2019 10:11)  Keppra 250 mg oral tablet: 1 tab(s) orally 2 times a day (03 Jun 2019 10:11)  Lantus 100 units/mL subcutaneous solution: 10 unit(s) subcutaneous once a day (at bedtime) (03 Jun 2019 10:11)  Metoprolol Tartrate 25 mg oral tablet: 1 tab(s) orally 2 times a day (03 Jun 2019 10:11)  Norvasc 5 mg oral tablet: 1 tab(s) orally once a day (03 Jun 2019 10:11)  NovoLOG 100 units/mL subcutaneous solution: sliding scale , ac (03 Jun 2019 10:11)  raNITIdine 150 mg oral tablet: orally once a day (03 Jun 2019 10:11)  sevelamer hydrochloride 800 mg oral tablet: 2 tab(s) orally 3 times a day (03 Jun 2019 10:11)    24HRS EVENT:    64yFemale s/p parathyroidectomy for tertiary hyperparathyroidism     Overnight: Ionized 0.99, started on calcium gluconate drip at 50cc/hr, hypotensive SBP 90s, when sleeping, normotensive once awake. will be transfusing 1u PRBC for HB of 7.6. baseline approx. 9.     NEURO:   Post op pain control:  -Tylenol , oxycodone PRN     TBI/seizure: (2009?)  with residual right sided contraction  - continue home Keppra    Neuropathy:  -continue gabapentin    RESP:   - No acute diagnosis  - Nasal Cannula as needed, maintain O saturation above 94%.  - IS/PT  - follow post op chest x ray    CARDS:  History of Afib, HTN  - restarted on home amlodipine, metoprolol  - holding home eliquis, will restart per primary team  - follow up post op ECG , due to concern for possible QTC prolongation  - Follow up cardiac enzymes  - Maintain MAP above 65    GI/NUTR:   Diet:  - Clear renal diet, will advance as tolerated     Prophylaxis:  -PPI  - Bowel regimen    /RENAL:   ESRD:  - dialysis on T,Th,S, access via left arm AV fistula- Pt not aware of nephrologists name   - IV fluids N/S @ 50, IV lock once patient is tolerating diet  - No brar, per patient she voids once a week  - F/u post op labs, replete as needed    HEME/ONC:   - No acute diagnosis  - Heparin S/Q, SCD  -symptomatic anemia with hg 7.6 & hypotension > 1uPRBC, f/u CBC at 11am    ID:   -No acute diagnosis  - follow up labs    ENDO:  S/P parathyroidectomy: 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 Horne [phone]    Diabetes:  - Home lantus, ISS      DVT PTX:  Hep SQ/SCD  GI PTX: PPI    ***Tubes/Lines/Drains  ***  Peripheral IV  Right radial janna    REVIEW OF SYSTEMS    [x ] A ten-point review of systems was otherwise negative except as noted.  [ ] Due to altered mental status/intubation, subjective information were not able to be obtained from the patient. History was obtained, to the extent possible, from review of the chart and collateral sources of information.      Daily Height in cm: 162.56 (03 Jun 2019 12:54)    Daily     Diet, Full Liquid:   For patients receiving Renal Replacement - No Protein Restr, No Conc K, No Conc Phos, Low Sodium (06-03-19 @ 18:32)      CURRENT MEDS:  Neurologic Medications  acetaminophen   Tablet .. 650 milliGRAM(s) Oral every 4 hours PRN Temp greater or equal to 38C (100.4F), Mild Pain (1 - 3)  gabapentin 100 milliGRAM(s) Oral three times a day  levETIRAcetam 250 milliGRAM(s) Oral two times a day  oxyCODONE    IR 5 milliGRAM(s) Oral every 6 hours PRN Moderate Pain (4 - 6)  oxyCODONE    IR 10 milliGRAM(s) Oral every 6 hours PRN Severe Pain (7 - 10)    Respiratory Medications    Cardiovascular Medications  metoprolol tartrate 25 milliGRAM(s) Oral two times a day    Gastrointestinal Medications  calcitriol   Capsule 0.5 MICROGram(s) Oral every 12 hours  calcium carbonate    500 mG (Tums) Chewable 2 Tablet(s) Chew two times a day  dextrose 5% 1000 milliLiter(s) IV Continuous <Continuous>  docusate sodium 100 milliGRAM(s) Oral two times a day  pantoprazole  Injectable 40 milliGRAM(s) IV Push at bedtime  senna 1 Tablet(s) Oral at bedtime  sodium chloride 0.9% lock flush 3 milliLiter(s) IV Push every 8 hours    Genitourinary Medications    Hematologic/Oncologic Medications  heparin  Injectable 5000 Unit(s) SubCutaneous every 8 hours    Antimicrobial/Immunologic Medications    Endocrine/Metabolic Medications  dextrose 40% Gel 15 Gram(s) Oral once PRN Blood Glucose LESS THAN 70 milliGRAM(s)/deciliter  insulin glargine Injectable (LANTUS) 10 Unit(s) SubCutaneous at bedtime  insulin lispro (HumaLOG) corrective regimen sliding scale   SubCutaneous Before meals and at bedtime    Topical/Other Medications  chlorhexidine 4% Liquid 1 Application(s) Topical <User Schedule>      ICU Vital Signs Last 24 Hrs  T(C): 36.8 (04 Jun 2019 04:00), Max: 36.8 (04 Jun 2019 04:00)  T(F): 98.2 (04 Jun 2019 04:00), Max: 98.2 (04 Jun 2019 04:00)  HR: 84 (04 Jun 2019 07:00) (62 - 90)  BP: 85/62 (04 Jun 2019 01:30) (85/62 - 162/96)  BP(mean): 73 (04 Jun 2019 01:30) (73 - 106)  ABP: 92/42 (04 Jun 2019 07:00) (68/60 - 170/74)  ABP(mean): 62 (04 Jun 2019 07:00) (54 - 118)  RR: 18 (04 Jun 2019 07:00) (7 - 25)  SpO2: 92% (04 Jun 2019 07:00) (92% - 100%)      Adult Advanced Hemodynamics Last 24 Hrs  CVP(mm Hg): --  CVP(cm H2O): --  CO: --  CI: --  PA: --  PA(mean): --  PCWP: --  SVR: --  SVRI: --  PVR: --  PVRI: --      ABG - ( 04 Jun 2019 05:11 )  pH, Arterial: 7.43  pH, Blood: x     /  pCO2: 41    /  pO2: 65    / HCO3: 27    / Base Excess: 2.2   /  SaO2: 93                  I&O's Summary    03 Jun 2019 07:01  -  04 Jun 2019 07:00  --------------------------------------------------------  IN: 836 mL / OUT: 0 mL / NET: 836 mL      I&O's Detail    03 Jun 2019 07:01  -  04 Jun 2019 07:00  --------------------------------------------------------  IN:    dextrose 5%: 200 mL    Oral Fluid: 240 mL    Packed Red Blood Cells: 296 mL    sodium chloride 0.9%: 100 mL  Total IN: 836 mL    OUT:  Total OUT: 0 mL    Total NET: 836 mL          PHYSICAL EXAM:    General/Neuro  RASS:             GCS:     = E   / V   / M      Deficits:                             alert & oriented x 3, no focal deficits  Pupils:    Lungs:      clear to auscultation, Normal expansion/effort.     Cardiovascular : S1, S2.  Regular rate and rhythm.  Peripheral edema   Cardiac Rhythm: Normal Sinus Rhythm    GI: Abdomen soft, Non-tender, Non-distended.    Gastrostomy / Jejunostomy tube in place.  Nasogastric tube in place.  Colostomy / Ileostomy.    Wound:    Extremities: Extremities warm, pink, well-perfused. Pulses:Rt     Lt    Derm: Good skin turgor, no skin breakdown.      :       Brar catheter in place.      CXR:       LABS:  CAPILLARY BLOOD GLUCOSE      POCT Blood Glucose.: 126 mg/dL (04 Jun 2019 05:13)  POCT Blood Glucose.: 128 mg/dL (03 Jun 2019 23:04)  POCT Blood Glucose.: 102 mg/dL (03 Jun 2019 10:10)                          7.6    5.97  )-----------( 182      ( 04 Jun 2019 00:30 )             24.0       PT    INR    PTT      06-04    144  |  110  |  37<H>  ----------------------------<  105<H>  3.6   |  19  |  5.8<HH>    Ca    5.5<LL>      04 Jun 2019 04:01  Phos  3.2     06-04  Mg     1.6     06-04    TPro  x   /  Alb  2.4<L>  /  TBili  x   /  DBili  x   /  AST  x   /  ALT  x   /  AlkPhos  x   06-04        CARDIAC MARKERS ( 03 Jun 2019 16:40 )  x     / 0.03 ng/mL / 79 U/L / x     / 1.9 ng/mL GIL GARCIA  [id number]  64y Female    Indication for ICU admission: s/p parathyroidectomy secondary to tertiary hyperparathyroidism, upgraded to SICU for close monitoring 2/2 multiple comorbotities and possible electrolyte derrangments     Admit Date: 6/3  ICU Date: 6/3  OR Date: 6/3    No Known Allergies    PAST MEDICAL & SURGICAL HISTORY:  Atrial fibrillation  Murmur  Peripheral neuropathy  Seizures: 2009, per pt none since  Diabetes  ESRD (end stage renal disease) on dialysis: tues, th,sa  Stroke: 2009, 2013  HTN (hypertension)  H/O umbilical hernia repair  H/O tubal ligation  S/P arteriovenous (AV) fistula creation    Home Medications:  Colace 100 mg oral capsule: 1 cap(s) orally 2 times a day (03 Jun 2019 10:11)  Eliquis 2.5 mg oral tablet: 1 tab(s) orally 2 times a day (03 Jun 2019 10:11)  gabapentin 100 mg oral tablet: orally 3 times a day (03 Jun 2019 10:11)  Keppra 250 mg oral tablet: 1 tab(s) orally 2 times a day (03 Jun 2019 10:11)  Lantus 100 units/mL subcutaneous solution: 10 unit(s) subcutaneous once a day (at bedtime) (03 Jun 2019 10:11)  Metoprolol Tartrate 25 mg oral tablet: 1 tab(s) orally 2 times a day (03 Jun 2019 10:11)  Norvasc 5 mg oral tablet: 1 tab(s) orally once a day (03 Jun 2019 10:11)  NovoLOG 100 units/mL subcutaneous solution: sliding scale , ac (03 Jun 2019 10:11)  raNITIdine 150 mg oral tablet: orally once a day (03 Jun 2019 10:11)  sevelamer hydrochloride 800 mg oral tablet: 2 tab(s) orally 3 times a day (03 Jun 2019 10:11)    24HRS EVENT:    64yFemale s/p parathyroidectomy for tertiary hyperparathyroidism     Overnight: Ionized 0.99, started on calcium gluconate drip at 50cc/hr, hypotensive SBP 90s, when sleeping, normotensive once awake. will be transfusing 1u PRBC for HB of 7.6. baseline approx. 9.     NEURO:   Post op pain control:  -Tylenol , oxycodone PRN     TBI/seizure: (2009?)  with residual right sided contraction  - continue home Keppra    Neuropathy:  -continue gabapentin    RESP:   - No acute diagnosis  - Nasal Cannula as needed, maintain O saturation above 94%.  - IS/PT  - follow post op chest x ray    CARDS:  History of Afib, HTN  - restarted on home amlodipine, metoprolol  - holding home eliquis, will restart per primary team  - follow up post op ECG , due to concern for possible QTC prolongation  - Follow up cardiac enzymes  - Maintain MAP above 65    GI/NUTR:   Diet:  - Clear renal diet, will advance as tolerated     Prophylaxis:  -PPI  - Bowel regimen    /RENAL:   ESRD:  - dialysis on T,Th,S, access via left arm AV fistula- Pt not aware of nephrologists name   - IV fluids N/S @ 50, IV lock once patient is tolerating diet  - No brar, per patient she voids once a week  - F/u post op labs, replete as needed    HEME/ONC:   - No acute diagnosis  - Heparin S/Q, SCD  -symptomatic anemia with hg 7.6 & hypotension > 1uPRBC, f/u CBC at 11am    ID:   -No acute diagnosis  - follow up labs    ENDO:  S/P parathyroidectomy: 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 Horne [phone]    Diabetes:  - Home lantus, ISS      DVT PTX:  Hep SQ/SCD  GI PTX: PPI    ***Tubes/Lines/Drains  ***  Peripheral IV  Right radial janna    REVIEW OF SYSTEMS    [x ] A ten-point review of systems was otherwise negative except as noted.  [ ] Due to altered mental status/intubation, subjective information were not able to be obtained from the patient. History was obtained, to the extent possible, from review of the chart and collateral sources of information.      Daily Height in cm: 162.56 (03 Jun 2019 12:54)    Daily     Diet, Full Liquid:   For patients receiving Renal Replacement - No Protein Restr, No Conc K, No Conc Phos, Low Sodium (06-03-19 @ 18:32)      CURRENT MEDS:  Neurologic Medications  acetaminophen   Tablet .. 650 milliGRAM(s) Oral every 4 hours PRN Temp greater or equal to 38C (100.4F), Mild Pain (1 - 3)  gabapentin 100 milliGRAM(s) Oral three times a day  levETIRAcetam 250 milliGRAM(s) Oral two times a day  oxyCODONE    IR 5 milliGRAM(s) Oral every 6 hours PRN Moderate Pain (4 - 6)  oxyCODONE    IR 10 milliGRAM(s) Oral every 6 hours PRN Severe Pain (7 - 10)    Respiratory Medications    Cardiovascular Medications  metoprolol tartrate 25 milliGRAM(s) Oral two times a day    Gastrointestinal Medications  calcitriol   Capsule 0.5 MICROGram(s) Oral every 12 hours  calcium carbonate    500 mG (Tums) Chewable 2 Tablet(s) Chew two times a day  dextrose 5% 1000 milliLiter(s) IV Continuous <Continuous>  docusate sodium 100 milliGRAM(s) Oral two times a day  pantoprazole  Injectable 40 milliGRAM(s) IV Push at bedtime  senna 1 Tablet(s) Oral at bedtime  sodium chloride 0.9% lock flush 3 milliLiter(s) IV Push every 8 hours    Genitourinary Medications    Hematologic/Oncologic Medications  heparin  Injectable 5000 Unit(s) SubCutaneous every 8 hours    Antimicrobial/Immunologic Medications    Endocrine/Metabolic Medications  dextrose 40% Gel 15 Gram(s) Oral once PRN Blood Glucose LESS THAN 70 milliGRAM(s)/deciliter  insulin glargine Injectable (LANTUS) 10 Unit(s) SubCutaneous at bedtime  insulin lispro (HumaLOG) corrective regimen sliding scale   SubCutaneous Before meals and at bedtime    Topical/Other Medications  chlorhexidine 4% Liquid 1 Application(s) Topical <User Schedule>      ICU Vital Signs Last 24 Hrs  T(C): 36.8 (04 Jun 2019 04:00), Max: 36.8 (04 Jun 2019 04:00)  T(F): 98.2 (04 Jun 2019 04:00), Max: 98.2 (04 Jun 2019 04:00)  HR: 84 (04 Jun 2019 07:00) (62 - 90)  BP: 85/62 (04 Jun 2019 01:30) (85/62 - 162/96)  BP(mean): 73 (04 Jun 2019 01:30) (73 - 106)  ABP: 92/42 (04 Jun 2019 07:00) (68/60 - 170/74)  ABP(mean): 62 (04 Jun 2019 07:00) (54 - 118)  RR: 18 (04 Jun 2019 07:00) (7 - 25)  SpO2: 92% (04 Jun 2019 07:00) (92% - 100%)      Adult Advanced Hemodynamics Last 24 Hrs  CVP(mm Hg): --  CVP(cm H2O): --  CO: --  CI: --  PA: --  PA(mean): --  PCWP: --  SVR: --  SVRI: --  PVR: --  PVRI: --      ABG - ( 04 Jun 2019 05:11 )  pH, Arterial: 7.43  pH, Blood: x     /  pCO2: 41    /  pO2: 65    / HCO3: 27    / Base Excess: 2.2   /  SaO2: 93                  I&O's Summary    03 Jun 2019 07:01  -  04 Jun 2019 07:00  --------------------------------------------------------  IN: 836 mL / OUT: 0 mL / NET: 836 mL      I&O's Detail    03 Jun 2019 07:01  -  04 Jun 2019 07:00  --------------------------------------------------------  IN:    dextrose 5%: 200 mL    Oral Fluid: 240 mL    Packed Red Blood Cells: 296 mL    sodium chloride 0.9%: 100 mL  Total IN: 836 mL    OUT:  Total OUT: 0 mL    Total NET: 836 mL          PHYSICAL EXAM:    General/Neuro  Patient is A and O x3, coherent speech. Left hemiparesis to physical exam. Pupils normal size and reactive    Neck: surgical wound dressing dry and clear with no secretions. Negative palpable emphysematous changes. No stridor present. Hemo Vac with minimal drain of blood    Lungs:      clear to auscultation, Normal expansion/effort. no rales present     Cardiovascular : S1, S2. negative for tachycardia, no JVD or significant peripheral edema  Cardiac Rhythm: Normal Sinus Rhythm    GI: Abdomen soft, Non-tender, Non-distended.     Extremities: Extremities warm, pink, well-perfused.     Derm: Good skin turgor, no skin breakdown.      : negative for brar in place      CXR:       LABS:  CAPILLARY BLOOD GLUCOSE      POCT Blood Glucose.: 126 mg/dL (04 Jun 2019 05:13)  POCT Blood Glucose.: 128 mg/dL (03 Jun 2019 23:04)  POCT Blood Glucose.: 102 mg/dL (03 Jun 2019 10:10)                          7.6    5.97  )-----------( 182      ( 04 Jun 2019 00:30 )             24.0       PT    INR    PTT      06-04    144  |  110  |  37<H>  ----------------------------<  105<H>  3.6   |  19  |  5.8<HH>    Ca    5.5<LL>      04 Jun 2019 04:01  Phos  3.2     06-04  Mg     1.6     06-04    TPro  x   /  Alb  2.4<L>  /  TBili  x   /  DBili  x   /  AST  x   /  ALT  x   /  AlkPhos  x   06-04        CARDIAC MARKERS ( 03 Jun 2019 16:40 )  x     / 0.03 ng/mL / 79 U/L / x     / 1.9 ng/mL

## 2019-06-04 NOTE — PROGRESS NOTE ADULT - ATTENDING COMMENTS
n: pain procedure tylenol atc oxy prn  chronic dm p neuropathy on home ralen  hx stroke with rle weakness  on home keppra, unknown indication (specifically)    p: no dx    c: ntntc  afib on home amlod and metop  holding eliquis, will restart per primary  following trop but interpreting with care considering ESRD    gi: renal diet, no dx  ppi    gu: ESRD with trs schedule L avf  plan for dialysis bedsied today  50cc/h will discontinue    h: acute on chronic anemia, given 1u prbcs yesterday    e: hypercalcemia, tertiary (ESRD)  hypocalcemia post-op likely hungry bone, normalized but will continue gtt for 24h  hypomag will replete   DM2 on lantus and novolog at home    ms: stage 4 sacral ulcer - wound consult in  oobchair     over 30 minutes of direct critical care provided to this patient

## 2019-06-04 NOTE — PROGRESS NOTE ADULT - ASSESSMENT
64 y.o F POD#1 s/p 3/4 parathyroidectomy, overnight events - developing hungry bone syndrome s/p administration of 1 U PRBC on  Calcium Gluconate, doing well in AM no acute complaints. Awaiting for HD today.   ESRD on HD ( T/T/S)    Plan:  HD today  F/U Nephrology recommendations regarding electrolyte management:  - increase CaCo3  2 tab Q4H PO  - increase Rocaltrol  to 1 mcg  Q12H PO  - increase Ca infusion( D5W 1L with 10 amps of Calcium Gluconate( 10% 10 ml each) 60 cc/hr  Titrate Ca:  Ca corrected 7.5 - 8.5 mg% or ionized 1.0-1.4 mmol/l  Monitor Serum Ca, iCa, PTH, Albumin, Mg, Phosphorus   Monitor H/H, transfuse prn.  Monitor Hemovac output ( 12 cc over night, as per RN)  Cont. analgesia prn   Cont ICU management until calcium is stable/no further need for IV replacement  ENT will F/U, will d/w Dr. Parrish

## 2019-06-04 NOTE — PHARMACOTHERAPY INTERVENTION NOTE - COMMENTS
Spoke with ELADIA Prakash regarding calcium gluconate continuous infusion. Order of 10 grams calcium gluconate in 1000 mL dextrose at a rate of 50mL/hour confirmed by ELADIA Prakash for hypocalcemia (ionized Ca<1.0 mmol/L) as per nephrology consult note on 06/03/19.

## 2019-06-04 NOTE — PROCEDURE NOTE - NSPROCDETAILS_GEN_ALL_CORE
sterile technique, catheter placed/location identified, draped/prepped, sterile technique used/ultrasound guidance/sterile dressing applied/ultrasound assessment

## 2019-06-04 NOTE — PROGRESS NOTE ADULT - ASSESSMENT
64yFemale s/p parathyroidectomy for tertiary hyperparathyroidism     NEURO:   Post op pain control:  -Tylenol , oxycodone PRN     TBI/seizure: (2009?)  with residual right sided contraction  - continue home Keppra    Neuropathy:  -continue gabapentin    RESP:   - No acute diagnosis  - Nasal Cannula as needed, maintain O saturation above 94%.  - IS/PT  - follow post op chest x ray    CARDS:  History of Afib, HTN  - hypotensive SBP 90s, when sleeping, normotensive once awake. will be transfusing 1u PRBC for HB of 7.6. baseline approx. 9.  - Holding amlodipine, continue metoprolol w/ parameters.   - holding home Eliquis will restart per primary team  - follow up post op ECG , due to concern for possible QTC prolongation  - Follow up cardiac enzymes  - Maintain MAP above 65    GI/NUTR:   Diet:  - Clear renal diet, will advance as tolerated     Prophylaxis:  -PPI  - Bowel regimen    /RENAL:   ESRD:  - dialysis on T,Th,S, access via left arm AV fistula- Pt not aware of nephrologists name   - IV fluids N/S @ 50, IV lock once patient is tolerating diet  - No brar, per patient she voids once a week  - F/u post op labs, replete as needed    HEME/ONC:   - No acute diagnosis  - Heparin S/Q, SCD  -symptomatic anemia with hg 7.6 & hypotension > 1uPRBC, f/u CBC at 11am    ID:   -No acute diagnosis  - follow up labs    ENDO:  S/P parathyroidectomy: 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 Horne [phone]    Diabetes:  - Home lantus, ISS      Disposition: Continue ICU care. 64yFemale s/p parathyroidectomy for tertiary hyperparathyroidism     NEURO:   Post op pain control:  -Tylenol , oxycodone PRN     TBI/seizure: (2009?)  with residual right sided contraction  - continue home Keppra    Neuropathy:  -continue gabapentin    RESP:   - No acute diagnosis  - Nasal Cannula as needed, maintain O saturation above 94%.  - IS/PT  - follow post op chest x ray  -follow ABG, patient saturating 93% on room air    CARDS:  History of Afib, HTN  - hypotensive SBP 90s, when sleeping, normotensive once awake. will be transfusing 1u PRBC for HB of 7.6. baseline approx. 9.  - Holding amlodipine, continue metoprolol w/ parameters.   - holding home Eliquis will restart per primary team  - follow up post op ECG , due to concern for possible QTC prolongation  - Follow up cardiac enzymes  - Maintain MAP above 65    GI/NUTR:   Diet:  - Clear renal diet, will advance as tolerated     Prophylaxis:  -PPI  - Bowel regimen    /RENAL:   ESRD:  - dialysis on T,Th,S, access via left arm AV fistula- Pt not aware of nephrologists name   - IV fluids N/S @ 50, IV lock once patient is tolerating diet  - No brar, per patient she voids once a week  - F/u post op labs, replete as needed    HEME/ONC:   - No acute diagnosis  - Heparin S/Q, SCD  -symptomatic anemia with hg 7.6 & hypotension > 1uPRBC, f/u CBC at 11am    ID:   -No acute diagnosis  - follow up labs    ENDO:  S/P parathyroidectomy: 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 Horne [phone]    Diabetes:  - Home lantus, ISS      Disposition: Continue ICU care. 64yFemale s/p parathyroidectomy for tertiary hyperparathyroidism     NEURO:   Post op pain control:  -Tylenol , oxycodone PRN     TBI/seizure: (2009?)  with residual right sided contraction  - continue home Keppra    Neuropathy:  -continue gabapentin    RESP:   - No acute diagnosis  - Nasal Cannula as needed, maintain O saturation above 94%.  - IS/PT  - follow post op chest x ray  -follow ABG, patient saturating 93% on room air    CARDS:  History of Afib, HTN  - hypotensive SBP 90s, when sleeping, normotensive once awake. will be transfusing 1u PRBC for HB of 7.6. baseline approx. 9.  - Holding amlodipine, continue metoprolol w/ parameters.   - holding home Eliquis will restart per primary team  - follow up post op ECG , due to concern for possible QTC prolongation  - Follow up cardiac enzymes  - Maintain MAP above 65    GI/NUTR:   Diet:  - Clear renal diet, will advance as tolerated     Prophylaxis:  -PPI  - Bowel regimen    /RENAL:   ESRD:  - dialysis on T,Th,S, access via left arm AV fistula- Pt not aware of nephrologists name   - IV fluids N/S @ 50, IV lock once patient is tolerating diet  - No brar, per patient she voids once a week  - F/u post op labs, replete as needed    HEME/ONC:   - No acute diagnosis  - Heparin S/Q, SCD  -symptomatic anemia with hg 7.6 & hypotension > 1uPRBC, f/u CBC at 11am    ID:   -No acute diagnosis  - follow up labs    ENDO:  S/P parathyroidectomy: 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 Horne [phone]    Diabetes:  - Home lantus, ISS      Disposition: Continue ICU care.   Case discussed with Dr. Real

## 2019-06-04 NOTE — PROGRESS NOTE ADULT - SUBJECTIVE AND OBJECTIVE BOX
Nephrology progress note    Patient is seen and examined, events over the last 24 h noted .  s/p parathyroidectomy POD#1, started on Calcium gluconate infusion last night (CA2+ was down to 0.99 mmol/l). Denies numbness of fingertips no twitching  Allergies:  No Known Allergies    Hospital Medications:   MEDICATIONS  (STANDING):  calcitriol   Capsule 0.5 MICROGram(s) Oral every 12 hours  calcium carbonate    500 mG (Tums) Chewable 2 Tablet(s) Chew two times a day  chlorhexidine 4% Liquid 1 Application(s) Topical <User Schedule>  dextrose 5% 1000 milliLiter(s) (50 mL/Hr) IV Continuous <Continuous>  docusate sodium 100 milliGRAM(s) Oral two times a day  gabapentin 100 milliGRAM(s) Oral three times a day  heparin  Injectable 5000 Unit(s) SubCutaneous every 8 hours  insulin glargine Injectable (LANTUS) 10 Unit(s) SubCutaneous at bedtime  insulin lispro (HumaLOG) corrective regimen sliding scale   SubCutaneous Before meals and at bedtime  levETIRAcetam 250 milliGRAM(s) Oral two times a day  metoprolol tartrate 25 milliGRAM(s) Oral two times a day  pantoprazole  Injectable 40 milliGRAM(s) IV Push at bedtime  senna 1 Tablet(s) Oral at bedtime  sodium chloride 0.9% lock flush 3 milliLiter(s) IV Push every 8 hours        VITALS:  T(F): 99.1 (06-04-19 @ 08:00), Max: 99.1 (06-04-19 @ 08:00)  HR: 84 (06-04-19 @ 07:00)  BP: 85/62 (06-04-19 @ 01:30)  RR: 18 (06-04-19 @ 07:00)  SpO2: 92% (06-04-19 @ 07:00)  Wt(kg): --    06-03 @ 07:01  -  06-04 @ 07:00  --------------------------------------------------------  IN: 836 mL / OUT: 0 mL / NET: 836 mL    06-04 @ 07:01  -  06-04 @ 08:07  --------------------------------------------------------  IN: 50 mL / OUT: 0 mL / NET: 50 mL      Height (cm): 162.56 (06-03 @ 12:54)  Weight (kg): 98.4 (06-03 @ 19:00)  BMI (kg/m2): 37.2 (06-03 @ 19:00)  BSA (m2): 2.02 (06-03 @ 19:00)    PHYSICAL EXAM:  Constitutional: NAD  HEENT: anicteric sclera, oropharynx clear, MMM  Neck: No JVD, post incision with UBALDO drain  Respiratory: CTAB  Cardiovascular: S1, S2, RRR  Gastrointestinal: BS+, soft, NT/ND  Extremities:  No peripheral edema  Neurological: A/O x 3  : No brar.   Skin: stage 4 sacral decub  Vascular Access: Lt AVF + bruit    LABS:  06-04    144  |  110  |  37<H>  ----------------------------<  105<H>  3.6   |  19  |  5.8<HH>    Ca    5.5<LL>      04 Jun 2019 04:01  Phos  3.2     06-04  Mg     1.6     06-04    TPro      /  Alb  2.4<L>  /  TBili      /  DBili      /  AST      /  ALT      /  AlkPhos      06-04                          7.6    5.97  )-----------( 182      ( 04 Jun 2019 00:30 )             24.0       Urine Studies:      RADIOLOGY & ADDITIONAL STUDIES:  < from: Xray Chest 1 View- PORTABLE-Urgent (06.03.19 @ 21:44) >  Low lung volumes, without radiographic evidence of acute cardiopulmonary   disease.    < end of copied text >

## 2019-06-04 NOTE — PROGRESS NOTE ADULT - ASSESSMENT
64/F with ESRD on HD TTS, tertiary hyperparathyroidism, CVA, seizures, IDDM, HTN admitted for parathyroidectomy.    s/p parathyroidectomy -3/4 glands removed yesterday, post op iPTH 277 <-2000 in Apr 2019  - developing hungry bone syndrome - low Calcium down to 6.8 mg% corrected, ionized 1.02 at 5 am, low Mag 1.6, phos normal 3.2  - increase CaCo3 2 tabl (1 gr) q 4 hrs po  - increase Rocaltrol to 1 mcg q 12 hrs po  - increase Ca infusion (D5W 1 Liter with 10 amps of Calcium gluconate (10%10ml each) 60 cc/hr. Titrate to Ca corrected 7.5 - 8.5 mg% or ionized 1.0-1.4 mmol/l  - cont to monitor serum calcium and albumin or ionized Ca, magnesium and phosphorus every 4-6 hrs  - no phosphate binders sevelamer d/theodore)  - monitor EKG -QT interval  - repeat iPTH    US thyroid/[parathyroid noted (5/22) thyroid nodule - will need a bx    ESRD - HD due today  3 K bath  UF none    Anemia - reepat Hb, transfuse if Hb<7.0  will check iron stores and start Darbepoetin 40 with HD weekly    Seizures - cont current meds  monitor for seizures with worsening hypocalcemia    Malnutrition - sacral decub  -alb 2.4 - start Nepro 1 can po bid  - nutrition consult Dr Stoddard  -may need IDPN with HD    DM - monitor FS, on Insulin    D/W ICU ENT team    Will follow

## 2019-06-04 NOTE — PROGRESS NOTE ADULT - SUBJECTIVE AND OBJECTIVE BOX
64 y.o F with PMH of tertiary hyperparathyroidisms POD#1 s/p 3/4 Parathyroidectomy, overnight labs noted -  s/p 1 PRBC, calcium Gluconate started x developing hungry bone syndrome. Pt. seen and examined at bedside in NAD, denies numbness and tingling sensations to the face and fingers, denies throat pain. Afebrile.      Vital Signs Last 24 Hrs  T(C): 37.3 (04 Jun 2019 08:00), Max: 37.3 (04 Jun 2019 08:00)  T(F): 99.1 (04 Jun 2019 08:00), Max: 99.1 (04 Jun 2019 08:00)  HR: 84 (04 Jun 2019 07:00) (62 - 90)  BP: 85/62 (04 Jun 2019 01:30) (85/62 - 162/96)  BP(mean): 73 (04 Jun 2019 01:30) (73 - 106)  RR: 18 (04 Jun 2019 07:00) (7 - 25)  SpO2: 92% (04 Jun 2019 07:00) (92% - 100%)      MEDICATIONS  (STANDING):  calcitriol   Capsule 0.5 MICROGram(s) Oral every 12 hours  calcium carbonate    500 mG (Tums) Chewable 2 Tablet(s) Chew two times a day  chlorhexidine 4% Liquid 1 Application(s) Topical <User Schedule>  darbepoetin Injectable Syringe 40 MICROGram(s) IV Push every 7 days  dextrose 5% 1000 milliLiter(s) (50 mL/Hr) IV Continuous <Continuous>  docusate sodium 100 milliGRAM(s) Oral two times a day  gabapentin 100 milliGRAM(s) Oral three times a day  heparin  Injectable 5000 Unit(s) SubCutaneous every 8 hours  insulin glargine Injectable (LANTUS) 10 Unit(s) SubCutaneous at bedtime  insulin lispro (HumaLOG) corrective regimen sliding scale   SubCutaneous Before meals and at bedtime  levETIRAcetam 250 milliGRAM(s) Oral two times a day  metoprolol tartrate 25 milliGRAM(s) Oral two times a day  pantoprazole  Injectable 40 milliGRAM(s) IV Push at bedtime  senna 1 Tablet(s) Oral at bedtime  sodium chloride 0.9% lock flush 3 milliLiter(s) IV Push every 8 hours    MEDICATIONS  (PRN):  acetaminophen   Tablet .. 650 milliGRAM(s) Oral every 4 hours PRN Temp greater or equal to 38C (100.4F), Mild Pain (1 - 3)  dextrose 40% Gel 15 Gram(s) Oral once PRN Blood Glucose LESS THAN 70 milliGRAM(s)/deciliter  oxyCODONE    IR 5 milliGRAM(s) Oral every 6 hours PRN Moderate Pain (4 - 6)  oxyCODONE    IR 10 milliGRAM(s) Oral every 6 hours PRN Severe Pain (7 - 10)    PE:  General: Awake and Alert in NAD, no drooling.   HEENT: NC/ AT, EOMI  Neck: anterior neck incision covered with Steri-strips intact , slightly soiled with serosanguineous over site of Hemovac + mild TTP. Hemovac in place drains minimal serosanguineous.   Mouth: mucosa pink moist, tongue and uvula midline, no FOM ttp    Abd: Soft, NT/ND     I&O's Detail    03 Jun 2019 07:01  -  04 Jun 2019 07:00  --------------------------------------------------------  IN:    dextrose 5%: 200 mL    Oral Fluid: 240 mL    Packed Red Blood Cells: 296 mL    sodium chloride 0.9%: 100 mL  Total IN: 836 mL    OUT:  Total OUT: 0 mL    Total NET: 836 mL      04 Jun 2019 07:01  -  04 Jun 2019 08:55  --------------------------------------------------------  IN:    dextrose 5%: 50 mL  Total IN: 50 mL    OUT:  Total OUT: 0 mL    Total NET: 50 mL        LABS:                        7.6    5.97  )-----------( 182      ( 04 Jun 2019 00:30 )             24.0     06-04    144  |  110  |  37<H>  ----------------------------<  105<H>  3.6   |  19  |  5.8<HH>    Ca    5.5<LL>      04 Jun 2019 04:01  Phos  3.2     06-04  Mg     1.6     06-04    TPro  x   /  Alb  2.4<L>  /  TBili  x   /  DBili  x   /  AST  x   /  ALT  x   /  AlkPhos  x   06-04

## 2019-06-05 LAB
ALBUMIN SERPL ELPH-MCNC: 2.7 G/DL — LOW (ref 3.5–5.2)
ALBUMIN SERPL ELPH-MCNC: 2.7 G/DL — LOW (ref 3.5–5.2)
ALBUMIN SERPL ELPH-MCNC: 2.8 G/DL — LOW (ref 3.5–5.2)
ALBUMIN SERPL ELPH-MCNC: 2.9 G/DL — LOW (ref 3.5–5.2)
ALBUMIN SERPL ELPH-MCNC: 2.9 G/DL — LOW (ref 3.5–5.2)
ALP SERPL-CCNC: 561 U/L — HIGH (ref 30–115)
ALP SERPL-CCNC: 580 U/L — HIGH (ref 30–115)
ALP SERPL-CCNC: 594 U/L — HIGH (ref 30–115)
ALT FLD-CCNC: <5 U/L — SIGNIFICANT CHANGE UP (ref 0–41)
ANION GAP SERPL CALC-SCNC: 11 MMOL/L — SIGNIFICANT CHANGE UP (ref 7–14)
ANION GAP SERPL CALC-SCNC: 12 MMOL/L — SIGNIFICANT CHANGE UP (ref 7–14)
ANION GAP SERPL CALC-SCNC: 12 MMOL/L — SIGNIFICANT CHANGE UP (ref 7–14)
ANION GAP SERPL CALC-SCNC: 13 MMOL/L — SIGNIFICANT CHANGE UP (ref 7–14)
ANION GAP SERPL CALC-SCNC: 14 MMOL/L — SIGNIFICANT CHANGE UP (ref 7–14)
AST SERPL-CCNC: 10 U/L — SIGNIFICANT CHANGE UP (ref 0–41)
AST SERPL-CCNC: 10 U/L — SIGNIFICANT CHANGE UP (ref 0–41)
AST SERPL-CCNC: 9 U/L — SIGNIFICANT CHANGE UP (ref 0–41)
BILIRUB DIRECT SERPL-MCNC: <0.2 MG/DL — SIGNIFICANT CHANGE UP (ref 0–0.2)
BILIRUB SERPL-MCNC: 0.2 MG/DL — SIGNIFICANT CHANGE UP (ref 0.2–1.2)
BILIRUB SERPL-MCNC: 0.3 MG/DL — SIGNIFICANT CHANGE UP (ref 0.2–1.2)
BILIRUB SERPL-MCNC: <0.2 MG/DL — SIGNIFICANT CHANGE UP (ref 0.2–1.2)
BUN SERPL-MCNC: 22 MG/DL — HIGH (ref 10–20)
BUN SERPL-MCNC: 23 MG/DL — HIGH (ref 10–20)
BUN SERPL-MCNC: 24 MG/DL — HIGH (ref 10–20)
BUN SERPL-MCNC: 26 MG/DL — HIGH (ref 10–20)
BUN SERPL-MCNC: 30 MG/DL — HIGH (ref 10–20)
CALCIUM SERPL-MCNC: 6.7 MG/DL — LOW (ref 8.5–10.1)
CALCIUM SERPL-MCNC: 7.2 MG/DL — LOW (ref 8.4–10.5)
CALCIUM SERPL-MCNC: 7.2 MG/DL — LOW (ref 8.5–10.1)
CALCIUM SERPL-MCNC: 7.4 MG/DL — LOW (ref 8.5–10.1)
CALCIUM SERPL-MCNC: 7.5 MG/DL — LOW (ref 8.5–10.1)
CALCIUM SERPL-MCNC: 7.6 MG/DL — LOW (ref 8.5–10.1)
CHLORIDE SERPL-SCNC: 91 MMOL/L — LOW (ref 98–110)
CHLORIDE SERPL-SCNC: 91 MMOL/L — LOW (ref 98–110)
CHLORIDE SERPL-SCNC: 92 MMOL/L — LOW (ref 98–110)
CHLORIDE SERPL-SCNC: 94 MMOL/L — LOW (ref 98–110)
CHLORIDE SERPL-SCNC: 95 MMOL/L — LOW (ref 98–110)
CO2 SERPL-SCNC: 29 MMOL/L — SIGNIFICANT CHANGE UP (ref 17–32)
CO2 SERPL-SCNC: 29 MMOL/L — SIGNIFICANT CHANGE UP (ref 17–32)
CO2 SERPL-SCNC: 30 MMOL/L — SIGNIFICANT CHANGE UP (ref 17–32)
CO2 SERPL-SCNC: 30 MMOL/L — SIGNIFICANT CHANGE UP (ref 17–32)
CO2 SERPL-SCNC: 32 MMOL/L — SIGNIFICANT CHANGE UP (ref 17–32)
CREAT SERPL-MCNC: 4.4 MG/DL — CRITICAL HIGH (ref 0.7–1.5)
CREAT SERPL-MCNC: 4.6 MG/DL — CRITICAL HIGH (ref 0.7–1.5)
CREAT SERPL-MCNC: 5 MG/DL — CRITICAL HIGH (ref 0.7–1.5)
CREAT SERPL-MCNC: 5.2 MG/DL — CRITICAL HIGH (ref 0.7–1.5)
CREAT SERPL-MCNC: 5.5 MG/DL — CRITICAL HIGH (ref 0.7–1.5)
FERRITIN SERPL-MCNC: 465 NG/ML — HIGH (ref 15–150)
GLUCOSE BLDC GLUCOMTR-MCNC: 151 MG/DL — HIGH (ref 70–99)
GLUCOSE BLDC GLUCOMTR-MCNC: 167 MG/DL — HIGH (ref 70–99)
GLUCOSE BLDC GLUCOMTR-MCNC: 172 MG/DL — HIGH (ref 70–99)
GLUCOSE BLDC GLUCOMTR-MCNC: 80 MG/DL — SIGNIFICANT CHANGE UP (ref 70–99)
GLUCOSE SERPL-MCNC: 147 MG/DL — HIGH (ref 70–99)
GLUCOSE SERPL-MCNC: 158 MG/DL — HIGH (ref 70–99)
GLUCOSE SERPL-MCNC: 187 MG/DL — HIGH (ref 70–99)
GLUCOSE SERPL-MCNC: 198 MG/DL — HIGH (ref 70–99)
GLUCOSE SERPL-MCNC: 78 MG/DL — SIGNIFICANT CHANGE UP (ref 70–99)
HCT VFR BLD CALC: 24.5 % — LOW (ref 37–47)
HCT VFR BLD CALC: 25.3 % — LOW (ref 37–47)
HGB BLD-MCNC: 8 G/DL — LOW (ref 12–16)
HGB BLD-MCNC: 8.2 G/DL — LOW (ref 12–16)
MAGNESIUM SERPL-MCNC: 2 MG/DL — SIGNIFICANT CHANGE UP (ref 1.8–2.4)
MAGNESIUM SERPL-MCNC: 2 MG/DL — SIGNIFICANT CHANGE UP (ref 1.8–2.4)
MAGNESIUM SERPL-MCNC: 2.2 MG/DL — SIGNIFICANT CHANGE UP (ref 1.8–2.4)
MCHC RBC-ENTMCNC: 30.4 PG — SIGNIFICANT CHANGE UP (ref 27–31)
MCHC RBC-ENTMCNC: 30.7 PG — SIGNIFICANT CHANGE UP (ref 27–31)
MCHC RBC-ENTMCNC: 32.4 G/DL — SIGNIFICANT CHANGE UP (ref 32–37)
MCHC RBC-ENTMCNC: 32.7 G/DL — SIGNIFICANT CHANGE UP (ref 32–37)
MCV RBC AUTO: 93.7 FL — SIGNIFICANT CHANGE UP (ref 81–99)
MCV RBC AUTO: 93.9 FL — SIGNIFICANT CHANGE UP (ref 81–99)
NRBC # BLD: 0 /100 WBCS — SIGNIFICANT CHANGE UP (ref 0–0)
NRBC # BLD: 0 /100 WBCS — SIGNIFICANT CHANGE UP (ref 0–0)
PHOSPHATE SERPL-MCNC: 2.9 MG/DL — SIGNIFICANT CHANGE UP (ref 2.1–4.9)
PHOSPHATE SERPL-MCNC: 3.1 MG/DL — SIGNIFICANT CHANGE UP (ref 2.1–4.9)
PHOSPHATE SERPL-MCNC: 3.1 MG/DL — SIGNIFICANT CHANGE UP (ref 2.1–4.9)
PLATELET # BLD AUTO: 153 K/UL — SIGNIFICANT CHANGE UP (ref 130–400)
PLATELET # BLD AUTO: 161 K/UL — SIGNIFICANT CHANGE UP (ref 130–400)
POTASSIUM SERPL-MCNC: 3.8 MMOL/L — SIGNIFICANT CHANGE UP (ref 3.5–5)
POTASSIUM SERPL-MCNC: 4.1 MMOL/L — SIGNIFICANT CHANGE UP (ref 3.5–5)
POTASSIUM SERPL-MCNC: 4.2 MMOL/L — SIGNIFICANT CHANGE UP (ref 3.5–5)
POTASSIUM SERPL-MCNC: 4.3 MMOL/L — SIGNIFICANT CHANGE UP (ref 3.5–5)
POTASSIUM SERPL-MCNC: 4.3 MMOL/L — SIGNIFICANT CHANGE UP (ref 3.5–5)
POTASSIUM SERPL-SCNC: 3.8 MMOL/L — SIGNIFICANT CHANGE UP (ref 3.5–5)
POTASSIUM SERPL-SCNC: 4.1 MMOL/L — SIGNIFICANT CHANGE UP (ref 3.5–5)
POTASSIUM SERPL-SCNC: 4.2 MMOL/L — SIGNIFICANT CHANGE UP (ref 3.5–5)
POTASSIUM SERPL-SCNC: 4.3 MMOL/L — SIGNIFICANT CHANGE UP (ref 3.5–5)
POTASSIUM SERPL-SCNC: 4.3 MMOL/L — SIGNIFICANT CHANGE UP (ref 3.5–5)
PROT SERPL-MCNC: 5.7 G/DL — LOW (ref 6–8)
PROT SERPL-MCNC: 5.8 G/DL — LOW (ref 6–8)
PROT SERPL-MCNC: 6 G/DL — SIGNIFICANT CHANGE UP (ref 6–8)
PTH-INTACT FLD-MCNC: 255 PG/ML — HIGH (ref 15–65)
RBC # BLD: 2.61 M/UL — LOW (ref 4.2–5.4)
RBC # BLD: 2.7 M/UL — LOW (ref 4.2–5.4)
RBC # FLD: 15.4 % — HIGH (ref 11.5–14.5)
RBC # FLD: 15.8 % — HIGH (ref 11.5–14.5)
SODIUM SERPL-SCNC: 133 MMOL/L — LOW (ref 135–146)
SODIUM SERPL-SCNC: 134 MMOL/L — LOW (ref 135–146)
SODIUM SERPL-SCNC: 134 MMOL/L — LOW (ref 135–146)
SODIUM SERPL-SCNC: 137 MMOL/L — SIGNIFICANT CHANGE UP (ref 135–146)
SODIUM SERPL-SCNC: 137 MMOL/L — SIGNIFICANT CHANGE UP (ref 135–146)
SURGICAL PATHOLOGY STUDY: SIGNIFICANT CHANGE UP
WBC # BLD: 4.48 K/UL — LOW (ref 4.8–10.8)
WBC # BLD: 6.23 K/UL — SIGNIFICANT CHANGE UP (ref 4.8–10.8)
WBC # FLD AUTO: 4.48 K/UL — LOW (ref 4.8–10.8)
WBC # FLD AUTO: 6.23 K/UL — SIGNIFICANT CHANGE UP (ref 4.8–10.8)

## 2019-06-05 PROCEDURE — 99233 SBSQ HOSP IP/OBS HIGH 50: CPT

## 2019-06-05 RX ORDER — SODIUM CHLORIDE 9 MG/ML
1000 INJECTION, SOLUTION INTRAVENOUS
Refills: 0 | Status: DISCONTINUED | OUTPATIENT
Start: 2019-06-05 | End: 2019-06-06

## 2019-06-05 RX ORDER — AMLODIPINE BESYLATE 2.5 MG/1
5 TABLET ORAL DAILY
Refills: 0 | Status: DISCONTINUED | OUTPATIENT
Start: 2019-06-05 | End: 2019-06-09

## 2019-06-05 RX ORDER — SODIUM HYPOCHLORITE 0.125 %
1 SOLUTION, NON-ORAL MISCELLANEOUS EVERY 12 HOURS
Refills: 0 | Status: DISCONTINUED | OUTPATIENT
Start: 2019-06-05 | End: 2019-06-09

## 2019-06-05 RX ORDER — PANTOPRAZOLE SODIUM 20 MG/1
40 TABLET, DELAYED RELEASE ORAL
Refills: 0 | Status: DISCONTINUED | OUTPATIENT
Start: 2019-06-05 | End: 2019-06-09

## 2019-06-05 RX ORDER — SENNA PLUS 8.6 MG/1
2 TABLET ORAL AT BEDTIME
Refills: 0 | Status: DISCONTINUED | OUTPATIENT
Start: 2019-06-05 | End: 2019-06-09

## 2019-06-05 RX ADMIN — SENNA PLUS 2 TABLET(S): 8.6 TABLET ORAL at 21:06

## 2019-06-05 RX ADMIN — Medication 100 MILLIGRAM(S): at 17:26

## 2019-06-05 RX ADMIN — PANTOPRAZOLE SODIUM 40 MILLIGRAM(S): 20 TABLET, DELAYED RELEASE ORAL at 13:18

## 2019-06-05 RX ADMIN — Medication 100 MILLIGRAM(S): at 06:42

## 2019-06-05 RX ADMIN — SODIUM CHLORIDE 3 MILLILITER(S): 9 INJECTION INTRAMUSCULAR; INTRAVENOUS; SUBCUTANEOUS at 21:17

## 2019-06-05 RX ADMIN — GABAPENTIN 100 MILLIGRAM(S): 400 CAPSULE ORAL at 06:39

## 2019-06-05 RX ADMIN — Medication 650 MILLIGRAM(S): at 00:46

## 2019-06-05 RX ADMIN — Medication 650 MILLIGRAM(S): at 07:52

## 2019-06-05 RX ADMIN — HEPARIN SODIUM 5000 UNIT(S): 5000 INJECTION INTRAVENOUS; SUBCUTANEOUS at 13:18

## 2019-06-05 RX ADMIN — INSULIN GLARGINE 10 UNIT(S): 100 INJECTION, SOLUTION SUBCUTANEOUS at 21:05

## 2019-06-05 RX ADMIN — CHLORHEXIDINE GLUCONATE 1 APPLICATION(S): 213 SOLUTION TOPICAL at 02:46

## 2019-06-05 RX ADMIN — Medication 2 TABLET(S): at 02:54

## 2019-06-05 RX ADMIN — Medication 650 MILLIGRAM(S): at 17:56

## 2019-06-05 RX ADMIN — Medication 25 MILLIGRAM(S): at 17:25

## 2019-06-05 RX ADMIN — Medication 650 MILLIGRAM(S): at 17:26

## 2019-06-05 RX ADMIN — SODIUM CHLORIDE 3 MILLILITER(S): 9 INJECTION INTRAMUSCULAR; INTRAVENOUS; SUBCUTANEOUS at 05:18

## 2019-06-05 RX ADMIN — Medication 2 TABLET(S): at 13:18

## 2019-06-05 RX ADMIN — Medication 2 TABLET(S): at 10:51

## 2019-06-05 RX ADMIN — AMLODIPINE BESYLATE 5 MILLIGRAM(S): 2.5 TABLET ORAL at 17:28

## 2019-06-05 RX ADMIN — CALCITRIOL 1 MICROGRAM(S): 0.5 CAPSULE ORAL at 06:42

## 2019-06-05 RX ADMIN — Medication 650 MILLIGRAM(S): at 11:39

## 2019-06-05 RX ADMIN — HEPARIN SODIUM 5000 UNIT(S): 5000 INJECTION INTRAVENOUS; SUBCUTANEOUS at 06:40

## 2019-06-05 RX ADMIN — LEVETIRACETAM 250 MILLIGRAM(S): 250 TABLET, FILM COATED ORAL at 06:40

## 2019-06-05 RX ADMIN — HEPARIN SODIUM 5000 UNIT(S): 5000 INJECTION INTRAVENOUS; SUBCUTANEOUS at 21:06

## 2019-06-05 RX ADMIN — GABAPENTIN 100 MILLIGRAM(S): 400 CAPSULE ORAL at 13:18

## 2019-06-05 RX ADMIN — CALCITRIOL 1 MICROGRAM(S): 0.5 CAPSULE ORAL at 17:27

## 2019-06-05 RX ADMIN — Medication 2 TABLET(S): at 21:07

## 2019-06-05 RX ADMIN — Medication 2 TABLET(S): at 17:30

## 2019-06-05 RX ADMIN — LEVETIRACETAM 250 MILLIGRAM(S): 250 TABLET, FILM COATED ORAL at 17:26

## 2019-06-05 RX ADMIN — SODIUM CHLORIDE 3 MILLILITER(S): 9 INJECTION INTRAMUSCULAR; INTRAVENOUS; SUBCUTANEOUS at 13:19

## 2019-06-05 RX ADMIN — Medication 650 MILLIGRAM(S): at 06:40

## 2019-06-05 RX ADMIN — Medication 1 APPLICATION(S): at 22:20

## 2019-06-05 RX ADMIN — GABAPENTIN 100 MILLIGRAM(S): 400 CAPSULE ORAL at 21:07

## 2019-06-05 RX ADMIN — SODIUM CHLORIDE 30 MILLILITER(S): 9 INJECTION, SOLUTION INTRAVENOUS at 20:58

## 2019-06-05 RX ADMIN — Medication 2 TABLET(S): at 06:39

## 2019-06-05 NOTE — PROGRESS NOTE ADULT - SUBJECTIVE AND OBJECTIVE BOX
Nephrology progress note    Patient is seen and examined, events over the last 24 h noted .    Allergies:  No Known Allergies    Hospital Medications:   MEDICATIONS  (STANDING):  acetaminophen   Tablet .. 650 milliGRAM(s) Oral every 6 hours  calcitriol   Capsule 1 MICROGram(s) Oral every 12 hours  calcium carbonate    500 mG (Tums) Chewable 2 Tablet(s) Chew every 4 hours  chlorhexidine 4% Liquid 1 Application(s) Topical <User Schedule>  darbepoetin Injectable Syringe 40 MICROGram(s) IV Push every 7 days  dextrose 5% 1000 milliLiter(s) (50 mL/Hr) IV Continuous <Continuous>  docusate sodium 100 milliGRAM(s) Oral two times a day  gabapentin 100 milliGRAM(s) Oral three times a day  heparin  Injectable 5000 Unit(s) SubCutaneous every 8 hours  insulin glargine Injectable (LANTUS) 10 Unit(s) SubCutaneous at bedtime  insulin lispro (HumaLOG) corrective regimen sliding scale   SubCutaneous Before meals and at bedtime  levETIRAcetam 250 milliGRAM(s) Oral two times a day  metoprolol tartrate 25 milliGRAM(s) Oral two times a day  pantoprazole  Injectable 40 milliGRAM(s) IV Push at bedtime  senna 1 Tablet(s) Oral at bedtime  sodium chloride 0.9% lock flush 3 milliLiter(s) IV Push every 8 hours        VITALS:  T(F): 97.8 (06-05-19 @ 08:00), Max: 99.8 (06-05-19 @ 04:00)  HR: 86 (06-05-19 @ 08:00)  BP: --  RR: 18 (06-05-19 @ 08:00)  SpO2: 98% (06-05-19 @ 08:00)  Wt(kg): --    06-03 @ 07:01  -  06-04 @ 07:00  --------------------------------------------------------  IN: 836 mL / OUT: 0 mL / NET: 836 mL    06-04 @ 07:01  -  06-05 @ 07:00  --------------------------------------------------------  IN: 1000 mL / OUT: 17 mL / NET: 983 mL    06-05 @ 07:01  -  06-05 @ 09:25  --------------------------------------------------------  IN: 50 mL / OUT: 25 mL / NET: 25 mL          PHYSICAL EXAM:  Constitutional: NAD  HEENT: anicteric sclera, oropharynx clear, MMM  Neck: No JVD  Respiratory: CTAB, no wheezes, rales or rhonchi  Cardiovascular: S1, S2, RRR  Gastrointestinal: BS+, soft, NT/ND  Extremities: No cyanosis or clubbing. No peripheral edema  :  No brar.   Skin: No rashes    LABS:  06-05    137  |  94<L>  |  23<H>  ----------------------------<  78  4.1   |  32  |  4.6<HH>    Ca    7.2<L>      05 Jun 2019 05:00  Phos  3.1     06-05  Mg     2.2     06-05    TPro  6.0  /  Alb  2.8<L>  /  TBili  0.3  /  DBili      /  AST  10  /  ALT  <5  /  AlkPhos  580<H>  06-05                          8.0    4.48  )-----------( 153      ( 04 Jun 2019 23:30 )             24.5       Urine Studies:      RADIOLOGY & ADDITIONAL STUDIES: Nephrology progress note    Patient is seen and examined, events over the last 24 h noted .  complained she can not cough    Allergies:  No Known Allergies    Hospital Medications:   MEDICATIONS  (STANDING):  acetaminophen   Tablet .. 650 milliGRAM(s) Oral every 6 hours  calcitriol   Capsule 1 MICROGram(s) Oral every 12 hours  calcium carbonate    500 mG (Tums) Chewable 2 Tablet(s) Chew every 4 hours  darbepoetin Injectable Syringe 40 MICROGram(s) IV Push every 7 days  dextrose 5% 1000 milliLiter(s) (50 mL/Hr) IV Continuous <Continuous>  docusate sodium 100 milliGRAM(s) Oral two times a day  gabapentin 100 milliGRAM(s) Oral three times a day  heparin  Injectable 5000 Unit(s) SubCutaneous every 8 hours  insulin glargine Injectable (LANTUS) 10 Unit(s) SubCutaneous at bedtime  insulin lispro (HumaLOG) corrective regimen sliding scale   SubCutaneous Before meals and at bedtime  levETIRAcetam 250 milliGRAM(s) Oral two times a day  metoprolol tartrate 25 milliGRAM(s) Oral two times a day  pantoprazole  Injectable 40 milliGRAM(s) IV Push at bedtime  senna 1 Tablet(s) Oral at bedtime  sodium chloride 0.9% lock flush 3 milliLiter(s) IV Push every 8 hours        VITALS:  T(F): 97.8 (06-05-19 @ 08:00), Max: 99.8 (06-05-19 @ 04:00)  HR: 86 (06-05-19 @ 08:00)  BP: 107/70  RR: 18 (06-05-19 @ 08:00)  SpO2: 98% (06-05-19 @ 08:00)  Wt(kg): --    06-03 @ 07:01  -  06-04 @ 07:00  --------------------------------------------------------  IN: 836 mL / OUT: 0 mL / NET: 836 mL    06-04 @ 07:01  -  06-05 @ 07:00  --------------------------------------------------------  IN: 1000 mL / OUT: 17 mL / NET: 983 mL    06-05 @ 07:01  -  06-05 @ 09:25  --------------------------------------------------------  IN: 50 mL / OUT: 25 mL / NET: 25 mL          PHYSICAL EXAM:  Constitutional: NAD  HEENT: anicteric sclera, oropharynx clear, MMM  Neck: No JVD positive drain   Respiratory: CTAB, no wheezes, rales or rhonchi  Cardiovascular: S1, S2, RRR  Gastrointestinal: BS+, soft, NT/ND  Extremities: No cyanosis or clubbing. No peripheral edema  :  No brar.   Skin: No rashes    LABS:  06-05    137  |  94<L>  |  23<H>  ----------------------------<  78  4.1   |  32  |  4.6<HH>    Ca    7.2<L>      05 Jun 2019 05:00  Phos  3.1     06-05  Mg     2.2     06-05    TPro  6.0  /  Alb  2.8<L>  /  TBili  0.3  /  DBili      /  AST  10  /  ALT  <5  /  AlkPhos  580<H>  06-05                          8.0    4.48  )-----------( 153      ( 04 Jun 2019 23:30 )             24.5       Urine Studies:      RADIOLOGY & ADDITIONAL STUDIES:

## 2019-06-05 NOTE — CONSULT NOTE ADULT - ATTENDING COMMENTS
Continuing wound care discussed with pt
discussed patient with sicu resident over the phone and made al critical care decisions.  Eamined the patient next day and confirmed the above findings

## 2019-06-05 NOTE — PROGRESS NOTE ADULT - ASSESSMENT
64yFemale s/p parathyroidectomy for tertiary hyperparathyroidism     NEURO:   Post op pain control:  -Tylenol , oxycodone PRN     TBI/seizure: (2009?)  with residual right sided contraction  - continue home Keppra    Neuropathy:  -continue gabapentin    RESP:   - No acute diagnosis  - Nasal Cannula as needed, maintain O saturation above 94%.  - IS/PT  - follow post op chest x ray  -follow ABG, patient saturating 93% on room air    CARDS:  History of Afib, HTN  - hypotensive SBP 90s, when sleeping, normotensive once awake. will be transfusing 1u PRBC for HB of 7.6. baseline approx. 9.  - Holding amlodipine, continue metoprolol w/ parameters.   - holding home Eliquis will restart per primary team  - follow up post op ECG , due to concern for possible QTC prolongation  - Follow up cardiac enzymes  - Maintain MAP above 65    GI/NUTR:   Diet:  - Clear renal diet, will advance as tolerated     Prophylaxis:  -PPI  - Bowel regimen    /RENAL:   ESRD:  - dialysis on T,Th,S, access via left arm AV fistula- Pt not aware of nephrologists name   - IV fluids N/S @ 50, IV lock once patient is tolerating diet  - No brar, per patient she voids once a week  - F/u post op labs, replete as needed    HEME/ONC:   - No acute diagnosis  - Heparin S/Q, SCD  -symptomatic anemia with hg 7.6 & hypotension > 1uPRBC, f/u CBC at 11am    ID:   -No acute diagnosis  - follow up labs    ENDO:  S/P parathyroidectomy: strict monitoring for hypocalcemia and hungry bone syndrome  - ca 7.7, started on calcium gluconate drip at 60cc/hr  - maintain corrected calcium range between 7.5- 8.5, ionized Ca   - DO NOT start on phosphate binders  - Monitor for S/S of hypocalcemia: prolonged QTC, tetany, arrhythmia, seizures, Chovostek, trousseaus   -follow up PTH  - trend calcium/albumin/mag/phos Q 6 hrs  - Start Calcitriol .5  po BID  - Start Ca carbonate 500mg BID when pt able to swallow   - Dr. Horne [phone]    Diabetes:  - Home lantus, ISS      Disposition: Continue ICU care. 64y Female s/p parathyroidectomy for tertiary hyperparathyroidism     NEURO:   Post op pain control:  -Tylenol , oxycodone PRN     TBI/seizure: (2009?)  with residual right sided contraction  - continue home Keppra    Neuropathy:  -continue gabapentin    RESP:   - No acute diagnosis  - Nasal Cannula as needed, maintain O saturation above 94%.  - IS/PT  - follow chest x ray    CARDS:  History of Afib, HTN  - hypotensive SBP 90s, when sleeping, normotensive once awake. will be transfusing 1u PRBC for HB of 7.6 on 6/4, last Hb 8  - Holding amlodipine, continue metoprolol w/ parameters.   - holding home Eliquis will restart per primary team  - Maintain MAP above 65    GI/NUTR:   Diet:  - full liquid renal diet, will advance as tolerated     Prophylaxis:  -PPI  - Bowel regimen    /RENAL:   ESRD:  - dialysis on T,Th,S, access via left arm AV fistula.  S/p HD yesterday, kept even  - No brar, per patient she voids once a week    HEME/ONC:   - No acute diagnosis  - Heparin S/Q, SCD  -symptomatic anemia with hg 7.6 & hypotension > 1uPRBC on 6/4, last Hb 8.0    ID:   -No acute diagnosis    ENDO:  S/P parathyroidectomy: strict monitoring for hypocalcemia and hungry bone syndrome  - ca 7.7, started on calcium gluconate drip at 60cc/hr, now 8.2, down to 50cc/hr  - maintain corrected calcium range between 7.5- 8.5, ionized Ca   - DO NOT start on phosphate binders  - Monitor for S/S of hypocalcemia: prolonged QTC, tetany, arrhythmia, seizures, Chovostek, trousseaus   -follow up PTH  - trend calcium/albumin/mag/phos Q 6 hrs  - Start Calcitriol .5  po BID  - Start Ca carbonate 500mg BID when pt able to swallow   - Dr. Horne [phone]    Diabetes:  - Home lantus, ISS      Disposition: Continue ICU care.

## 2019-06-05 NOTE — CONSULT NOTE ADULT - SUBJECTIVE AND OBJECTIVE BOX
64y  Female  HPI:  64y Female with PMhx of HTN, DM, ESRD-on HD, A-fib, Murmur,  CVA, neuropathy, Seizures, now s/p parathyroidectomy for tertiary hyperparathyroidism. BURN consult requested for sacral pressure wound management.     Allergies  No Known Allergies    Intolerances      PAST MEDICAL & SURGICAL HISTORY:  Atrial fibrillation  Murmur  Peripheral neuropathy  Seizures: 2009, per pt none since  Diabetes  ESRD (end stage renal disease) on dialysis: tues, th,sa  Stroke: 2009, 2013  HTN (hypertension)  H/O umbilical hernia repair  H/O tubal ligation  S/P arteriovenous (AV) fistula creation    LABS:  ABG - ( 04 Jun 2019 05:11 )  pH, Arterial: 7.43  pH, Blood: x     /  pCO2: 41    /  pO2: 65    / HCO3: 27    / Base Excess: 2.2   /  SaO2: 93    CBC Full  -  ( 04 Jun 2019 23:30 )  WBC Count : 4.48 K/uL  RBC Count : 2.61 M/uL  Hemoglobin : 8.0 g/dL  Hematocrit : 24.5 %  Platelet Count - Automated : 153 K/uL  Mean Cell Volume : 93.9 fL  Mean Cell Hemoglobin : 30.7 pg  Mean Cell Hemoglobin Concentration : 32.7 g/dL  Auto Neutrophil # : x  Auto Lymphocyte # : x  Auto Monocyte # : x  Auto Eosinophil # : x  Auto Basophil # : x  Auto Neutrophil % : x  Auto Lymphocyte % : x  Auto Monocyte % : x  Auto Eosinophil % : x  Auto Basophil % : x    06-05    133<L>  |  92<L>  |  24<H>  ----------------------------<  147<H>  4.3   |  29  |  5.0<HH>    Ca    7.4<L>      05 Jun 2019 10:15  Phos  3.1     06-05  Mg     2.2     06-05    TPro  x   /  Alb  2.9<L>  /  TBili  x   /  DBili  x   /  AST  x   /  ALT  x   /  AlkPhos  x   06-05    On exam: full thickness pressure wound of sacrum, 7cm x 5xm and 3cm deep, foul smelling, base with areas of black necrotic tissue.     ASSESSMENT: Stage 4  pressure ulcer     RECOMMENDATION:  * Wound care: wash wound with soap and water, then pack with moist with Dakins solution gauze, then cover with ABD and tape, two times a day.   * Offloading/ positional changes  * Will follow. 64y  Female  HPI:  64y Female with PMhx of HTN, DM, ESRD-on HD, A-fib, Murmur,  CVA, neuropathy, Seizures, now s/p parathyroidectomy for tertiary hyperparathyroidism. BURN consult requested for sacral pressure wound management.     Allergies  No Known Allergies    Intolerances      PAST MEDICAL & SURGICAL HISTORY:  Atrial fibrillation  Murmur  Peripheral neuropathy  Seizures: 2009, per pt none since  Diabetes  ESRD (end stage renal disease) on dialysis: tues, th,sa  Stroke: 2009, 2013  HTN (hypertension)  H/O umbilical hernia repair  H/O tubal ligation  S/P arteriovenous (AV) fistula creation    LABS:  ABG - ( 04 Jun 2019 05:11 )  pH, Arterial: 7.43  pH, Blood: x     /  pCO2: 41    /  pO2: 65    / HCO3: 27    / Base Excess: 2.2   /  SaO2: 93    CBC Full  -  ( 04 Jun 2019 23:30 )  WBC Count : 4.48 K/uL  RBC Count : 2.61 M/uL  Hemoglobin : 8.0 g/dL  Hematocrit : 24.5 %  Platelet Count - Automated : 153 K/uL  Mean Cell Volume : 93.9 fL  Mean Cell Hemoglobin : 30.7 pg  Mean Cell Hemoglobin Concentration : 32.7 g/dL  Auto Neutrophil # : x  Auto Lymphocyte # : x  Auto Monocyte # : x  Auto Eosinophil # : x  Auto Basophil # : x  Auto Neutrophil % : x  Auto Lymphocyte % : x  Auto Monocyte % : x  Auto Eosinophil % : x  Auto Basophil % : x    06-05    133<L>  |  92<L>  |  24<H>  ----------------------------<  147<H>  4.3   |  29  |  5.0<HH>    Ca    7.4<L>      05 Jun 2019 10:15  Phos  3.1     06-05  Mg     2.2     06-05    TPro  x   /  Alb  2.9<L>  /  TBili  x   /  DBili  x   /  AST  x   /  ALT  x   /  AlkPhos  x   06-05    EXAM:  full thickness pressure wound of sacrum~  7cm x 5xm and 3cm deep, foul smelling, base with areas of sl ecchymotic chronic pink granulation  surrounding healed scar and healed pale skin

## 2019-06-05 NOTE — PROGRESS NOTE ADULT - SUBJECTIVE AND OBJECTIVE BOX
GIL GARCIA  [id number]  64y Female    Indication for ICU admission: s/p parathyroidectomy secondary to tertiary hyperparathyroidism, upgraded to SICU for close monitoring 2/2 multiple comorbotities and possible electrolyte derrangments     Admit Date: 6/3  ICU Date: 6/3  OR Date: 6/3    No Known Allergies    PAST MEDICAL & SURGICAL HISTORY:  Atrial fibrillation  Murmur  Peripheral neuropathy  Seizures: 2009, per pt none since  Diabetes  ESRD (end stage renal disease) on dialysis: tues, th,sa  Stroke: 2009, 2013  HTN (hypertension)  H/O umbilical hernia repair  H/O tubal ligation  S/P arteriovenous (AV) fistula creation    Home Medications:  Colace 100 mg oral capsule: 1 cap(s) orally 2 times a day (03 Jun 2019 10:11)  Eliquis 2.5 mg oral tablet: 1 tab(s) orally 2 times a day (03 Jun 2019 10:11)  gabapentin 100 mg oral tablet: orally 3 times a day (03 Jun 2019 10:11)  Keppra 250 mg oral tablet: 1 tab(s) orally 2 times a day (03 Jun 2019 10:11)  Lantus 100 units/mL subcutaneous solution: 10 unit(s) subcutaneous once a day (at bedtime) (03 Jun 2019 10:11)  Metoprolol Tartrate 25 mg oral tablet: 1 tab(s) orally 2 times a day (03 Jun 2019 10:11)  Norvasc 5 mg oral tablet: 1 tab(s) orally once a day (03 Jun 2019 10:11)  NovoLOG 100 units/mL subcutaneous solution: sliding scale , ac (03 Jun 2019 10:11)  raNITIdine 150 mg oral tablet: orally once a day (03 Jun 2019 10:11)  sevelamer hydrochloride 800 mg oral tablet: 2 tab(s) orally 3 times a day (03 Jun 2019 10:11)    24HRS EVENT:    Overnight: Corrected calcium 7.7, continued on Calcium gtt @ 60, goal 7.5-8.5.    64yFemale s/p parathyroidectomy for tertiary hyperparathyroidism     NEURO:   Post op pain control:  -Tylenol , oxycodone PRN     TBI/seizure: (2009?)  with residual right sided contraction  - continue home Keppra    Neuropathy:  -continue gabapentin    RESP:   - No acute diagnosis  - Nasal Cannula as needed, maintain O saturation above 94%.  - IS/PT  - follow post op chest x ray    CARDS:  History of Afib, HTN  - restarted on home amlodipine, metoprolol  - holding home eliquis, will restart per primary team  - follow up post op ECG , due to concern for possible QTC prolongation  - Follow up cardiac enzymes  - Maintain MAP above 65    GI/NUTR:   Diet:  - Clear renal diet, will advance as tolerated     Prophylaxis:  -PPI  - Bowel regimen    /RENAL:   ESRD:  - dialysis on T,Th,S, access via left arm AV fistula- Pt not aware of nephrologists name   - IV fluids N/S @ 50, IV lock once patient is tolerating diet  - No brar, per patient she voids once a week  - F/u post op labs, replete as needed    HEME/ONC:   - No acute diagnosis  - Heparin S/Q, SCD  -symptomatic anemia with hg 7.6 & hypotension > 1uPRBC, f/u CBC at 11am    ID:   -No acute diagnosis  - follow up labs    ENDO:  S/P parathyroidectomy: 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 Horne [phone]    Diabetes:  - Home lantus, ISS      DVT PTX:  Hep SQ/SCD  GI PTX: PPI    ***Tubes/Lines/Drains  ***  Peripheral IV  Right radial janna    REVIEW OF SYSTEMS    [x ] A ten-point review of systems was otherwise negative except as noted.  [ ] Due to altered mental status/intubation, subjective information were not able to be obtained from the patient. History was obtained, to the extent possible, from review of the chart and collateral sources of information. GIL GARCIA  [id number]  64y Female    Indication for ICU admission: s/p parathyroidectomy secondary to tertiary hyperparathyroidism, upgraded to SICU for close monitoring 2/2 multiple comorbotities and possible electrolyte derrangments     Admit Date: 6/3  ICU Date: 6/3  OR Date: 6/3    No Known Allergies    PAST MEDICAL & SURGICAL HISTORY:  Atrial fibrillation  Murmur  Peripheral neuropathy  Seizures: 2009, per pt none since  Diabetes  ESRD (end stage renal disease) on dialysis: tues, th,sa  Stroke: 2009, 2013  HTN (hypertension)  H/O umbilical hernia repair  H/O tubal ligation  S/P arteriovenous (AV) fistula creation    Home Medications:  Colace 100 mg oral capsule: 1 cap(s) orally 2 times a day (03 Jun 2019 10:11)  Eliquis 2.5 mg oral tablet: 1 tab(s) orally 2 times a day (03 Jun 2019 10:11)  gabapentin 100 mg oral tablet: orally 3 times a day (03 Jun 2019 10:11)  Keppra 250 mg oral tablet: 1 tab(s) orally 2 times a day (03 Jun 2019 10:11)  Lantus 100 units/mL subcutaneous solution: 10 unit(s) subcutaneous once a day (at bedtime) (03 Jun 2019 10:11)  Metoprolol Tartrate 25 mg oral tablet: 1 tab(s) orally 2 times a day (03 Jun 2019 10:11)  Norvasc 5 mg oral tablet: 1 tab(s) orally once a day (03 Jun 2019 10:11)  NovoLOG 100 units/mL subcutaneous solution: sliding scale , ac (03 Jun 2019 10:11)  raNITIdine 150 mg oral tablet: orally once a day (03 Jun 2019 10:11)  sevelamer hydrochloride 800 mg oral tablet: 2 tab(s) orally 3 times a day (03 Jun 2019 10:11)    24HRS EVENT:    Overnight: Corrected calcium 7.7, continued on Calcium gtt @ 60, goal 7.5-8.5.    64yFemale s/p parathyroidectomy for tertiary hyperparathyroidism     NEURO:   Post op pain control:  -Tylenol , oxycodone PRN     TBI/seizure: (2009?)  with residual right sided contraction  - continue home Keppra    Neuropathy:  -continue gabapentin    RESP:   - No acute diagnosis  - Nasal Cannula as needed, maintain O saturation above 94%.  - IS/PT  - follow post op chest x ray    CARDS:  History of Afib, HTN  - restarted on home amlodipine, metoprolol  - holding home eliquis, will restart per primary team  - follow up post op ECG , due to concern for possible QTC prolongation  - Follow up cardiac enzymes  - Maintain MAP above 65    GI/NUTR:   Diet:  - Clear renal diet, will advance as tolerated     Prophylaxis:  -PPI  - Bowel regimen    /RENAL:   ESRD:  - dialysis on T,Th,S, access via left arm AV fistula- Pt not aware of nephrologists name   - IV fluids N/S @ 50, IV lock once patient is tolerating diet  - No brar, per patient she voids once a week  - F/u post op labs, replete as needed    HEME/ONC:   - No acute diagnosis  - Heparin S/Q, SCD  -symptomatic anemia with hg 7.6 & hypotension > 1uPRBC, f/u CBC at 11am    ID:   -No acute diagnosis  - follow up labs    ENDO:  S/P parathyroidectomy: 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 Horne [phone]    Diabetes:  - Home lantus, ISS      DVT PTX:  Hep SQ/SCD  GI PTX: PPI    ***Tubes/Lines/Drains  ***  Peripheral IV  Right radial janna    REVIEW OF SYSTEMS    [x ] A ten-point review of systems was otherwise negative except as noted.  [ ] Due to altered mental status/intubation, subjective information were not able to be obtained from the patient. History was obtained, to the extent possible, from review of the chart and collateral sources of information.    CURRENT MEDS:  Neurologic Medications  acetaminophen   Tablet .. 650 milliGRAM(s) Oral every 4 hours PRN Temp greater or equal to 38C (100.4F), Mild Pain (1 - 3)  acetaminophen   Tablet .. 650 milliGRAM(s) Oral every 6 hours  gabapentin 100 milliGRAM(s) Oral three times a day  levETIRAcetam 250 milliGRAM(s) Oral two times a day  oxyCODONE    IR 5 milliGRAM(s) Oral every 6 hours PRN Moderate Pain (4 - 6)  oxyCODONE    IR 10 milliGRAM(s) Oral every 6 hours PRN Severe Pain (7 - 10)    Respiratory Medications    Cardiovascular Medications  metoprolol tartrate 25 milliGRAM(s) Oral two times a day    Gastrointestinal Medications  calcitriol   Capsule 1 MICROGram(s) Oral every 12 hours  calcium carbonate    500 mG (Tums) Chewable 2 Tablet(s) Chew every 4 hours  dextrose 5% 1000 milliLiter(s) IV Continuous <Continuous>  docusate sodium 100 milliGRAM(s) Oral two times a day  pantoprazole  Injectable 40 milliGRAM(s) IV Push at bedtime  senna 1 Tablet(s) Oral at bedtime  sodium chloride 0.9% lock flush 3 milliLiter(s) IV Push every 8 hours    Genitourinary Medications    Hematologic/Oncologic Medications  darbepoetin Injectable Syringe 40 MICROGram(s) IV Push every 7 days  heparin  Injectable 5000 Unit(s) SubCutaneous every 8 hours    Antimicrobial/Immunologic Medications    Endocrine/Metabolic Medications  dextrose 40% Gel 15 Gram(s) Oral once PRN Blood Glucose LESS THAN 70 milliGRAM(s)/deciliter  insulin glargine Injectable (LANTUS) 10 Unit(s) SubCutaneous at bedtime  insulin lispro (HumaLOG) corrective regimen sliding scale   SubCutaneous Before meals and at bedtime    Topical/Other Medications  chlorhexidine 4% Liquid 1 Application(s) Topical <User Schedule>      ICU Vital Signs Last 24 Hrs  T(C): 37.7 (05 Jun 2019 04:00), Max: 37.7 (05 Jun 2019 04:00)  T(F): 99.8 (05 Jun 2019 04:00), Max: 99.8 (05 Jun 2019 04:00)  HR: 82 (05 Jun 2019 07:00) (72 - 104)  BP: --  BP(mean): --  ABP: 128/58 (05 Jun 2019 07:00) (82/44 - 156/72)  ABP(mean): 86 (05 Jun 2019 07:00) (58 - 108)  RR: 26 (05 Jun 2019 07:00) (8 - 34)  SpO2: 98% (05 Jun 2019 07:00) (94% - 100%)      ABG - ( 04 Jun 2019 05:11 )  pH, Arterial: 7.43  pH, Blood: x     /  pCO2: 41    /  pO2: 65    / HCO3: 27    / Base Excess: 2.2   /  SaO2: 93          I&O's Detail    04 Jun 2019 07:01  -  05 Jun 2019 07:00  --------------------------------------------------------  IN:    dextrose 5%: 950 mL    IV PiggyBack: 50 mL  Total IN: 1000 mL    OUT:    Accordian: 17 mL  Total OUT: 17 mL    Total NET: 983 mL        I&O's Summary    04 Jun 2019 07:01  -  05 Jun 2019 07:00  --------------------------------------------------------  IN: 1000 mL / OUT: 17 mL / NET: 983 mL      LABS:  CAPILLARY BLOOD GLUCOSE  POCT Blood Glucose.: 80 mg/dL (05 Jun 2019 05:16)  POCT Blood Glucose.: 157 mg/dL (04 Jun 2019 21:42)  POCT Blood Glucose.: 135 mg/dL (04 Jun 2019 16:45)  POCT Blood Glucose.: 160 mg/dL (04 Jun 2019 10:57)                          8.0    4.48  )-----------( 153      ( 04 Jun 2019 23:30 )             24.5       Auto Neutrophil %: 71.8 % (06-04-19 @ 11:00)  Auto Immature Granulocyte %: 0.2 % (06-04-19 @ 11:00)    06-05    137  |  94<L>  |  23<H>  ----------------------------<  78  4.1   |  32  |  4.6<HH>      Calcium, Total Serum: 7.2 mg/dL (06-05-19 @ 05:00)      LFTs:             6.0  | 0.3  | 10       ------------------[580     ( 05 Jun 2019 05:00 )  2.8  | x    | <5                Blood Gas Arterial, Lactate: 0.7 mmoL/L (06-04-19 @ 05:11)  Blood Gas Arterial, Lactate: 1.1 mmoL/L (06-04-19 @ 00:30)  Blood Gas Arterial, Lactate: 0.6 mmoL/L (06-03-19 @ 16:48)    ABG - ( 04 Jun 2019 05:11 )  pH: 7.43  /  pCO2: 41    /  pO2: 65    / HCO3: 27    / Base Excess: 2.2   /  SaO2: 93        ABG - ( 04 Jun 2019 00:30 )  pH: 7.40  /  pCO2: 45    /  pO2: 74    / HCO3: 28    / Base Excess: 2.6   /  SaO2: 94        ABG - ( 03 Jun 2019 16:48 )  pH: 7.38  /  pCO2: 47    /  pO2: 165   / HCO3: 28    / Base Excess: 2.4   /  SaO2: 99          Coags:    CARDIAC MARKERS ( 03 Jun 2019 16:40 )  x     / 0.03 ng/mL / 79 U/L / x     / 1.9 ng/mL          PHYSICAL EXAM:    General/Neuro  GCS:  15       Deficits:  alert & oriented x 3, no focal deficits  Anterior neck area: staples c/d/i with hemovac in place  Lungs:  clear to auscultation, Normal expansion/effort.   Cardiovascular : S1, S2.  Regular rate and rhythm.    Cardiac Rhythm: Normal Sinus Rhythm  GI: Abdomen soft, Non-tender, Non-distended.    Extremities: Extremities warm, pink, well-perfused.  No Peripheral edema b/l LE  Derm: Good skin turgor, no skin breakdown.    : No Brar catheter

## 2019-06-05 NOTE — PROGRESS NOTE ADULT - ASSESSMENT
64/F with ESRD on HD TTS, tertiary hyperparathyroidism, CVA, seizures, IDDM, HTN admitted for parathyroidectomy.    s/p parathyroidectomy -3/4 glands removed yesterday, post op iPTH 277 <-2000 in Apr 2019  - developing hungry bone syndrome - low Calcium down to 6.8 mg% corrected, ionized 1.02 at 5 am, low Mag 1.6, phos normal 3.2  - increase CaCo3 2 tabl (1 gr) q 4 hrs po  - increase Rocaltrol to 1 mcg q 12 hrs po  - increase Ca infusion (D5W 1 Liter with 10 amps of Calcium gluconate (10%10ml each) 60 cc/hr. Titrate to Ca corrected 7.5 - 8.5 mg% or ionized 1.0-1.4 mmol/l  - cont to monitor serum calcium and albumin or ionized Ca, magnesium and phosphorus every 4-6 hrs  - no phosphate binders sevelamer d/theodore)  - monitor EKG -QT interval  - repeat iPTH    US thyroid/[parathyroid noted (5/22) thyroid nodule - will need a bx    ESRD -sp HD yesterday     Anemia - reepat Hb, transfuse if Hb<7.0  will check iron stores and start Darbepoetin 40 with HD weekly    Seizures - cont current meds  monitor for seizures with worsening hypocalcemia    Malnutrition - sacral decub  -alb 2.4 - start Nepro 1 can po bid  - nutrition consult Dr Stoddard  -may need IDPN with HD    DM - monitor FS, on Insulin    D/W ICU ENT team    Will follow 64/F with ESRD on HD TTS, tertiary hyperparathyroidism, CVA, seizures, IDDM, HTN admitted for parathyroidectomy.    s/p parathyroidectomy -3/4 glands removed Monday , post op iPTH 277 <-2000 in Apr 2019  - at risk for hungry bone syndrome   - Ca as low as 5.5 and last one 7.2   - continue Ca CO3 current   - on calcitriol will add hectorol on HD   - if Ca> 7.5 can stop IV CA drip   - cont to monitor serum calcium and albumin or ionized Ca, magnesium and phosphorus every 4-6 hrs  - no phosphate binders sevelamer d/theodore)  - monitor EKG -QT interval  - repeat iPTH    US thyroid/[parathyroid noted (5/22) thyroid nodule - will need a bx    ESRD -sp HD yesterday no need for HD today     Anemia - repeat Hb, transfuse if Hb<7.0  will check iron stores on  Darbepoetin 40 with HD weekly    Seizures - cont current meds  monitor for seizures with worsening hypocalcemia    Malnutrition - sacral decub  -alb 2.4 - start Nepro 1 can po bid  - nutrition consult Dr Stoddard  -may need IDPN with HD    DM - monitor FS, on Insulin      Will follow

## 2019-06-05 NOTE — CONSULT NOTE ADULT - ASSESSMENT
ASSESSMENT:   Healing Stage 4  pressure ulcer   No grossly necrotic tissue noted. Odor likely due to missed dressing change   Evidence of significant prior healing     RECOMMENDATION:   Wound care: wash wound with soap and water, then pack with gauze moistened with Dakins solution gauze, then cover with ABD and tape, two times a day.    Offloading/ positional changes   Will follow.

## 2019-06-05 NOTE — PROGRESS NOTE ADULT - ASSESSMENT
64 y.o F POD#2 s/p 3/4 parathyroidectomy, no acute events overnight.   Post-op hungry bone syndromes/p administration of 1 U PRBC , Calcium Gluconate on 6/4/19   S/P HD on 6/4/19         Plan:  Cont. current ICU managment  Monitor and replace electrolytes  Cont. Calcitrol and Calcium Carbonate  F/U Nephrology recommendations.  Monitor Hemovac output.   Analgesia prn x pain  Monitor H/H, transfuse prn  ENT attending will f/u

## 2019-06-05 NOTE — CONSULT NOTE ADULT - CONSULT REASON
ESRD on HD  s/p parathyroidectomy
sacral wound
S/P parathyroidectomy, admitted due to multiple comorbidities, and for close monitoring of potential metabolic disarrangements.

## 2019-06-05 NOTE — DIETITIAN INITIAL EVALUATION ADULT. - ENERGY NEEDS
Calories: 9628-0169 kcals/day (30-35 kcals/kg ABW) for pt on HD w/ stage IV Protein:  g/day (1.3-1.6 g/kg ABW)- for reasons mentioned above  Fluids: per ICU team

## 2019-06-05 NOTE — PROGRESS NOTE ADULT - SUBJECTIVE AND OBJECTIVE BOX
64 y.o F with PMH of tertiary hyperparathyroidisms POD#2 s/p 3/4 Parathyroidectomy. Pt. seen and examined at bedside in NAD, s/p HD yesterday, no acute events overnight, states to mild sensation of tingling to LE, denies sensation of numbness or tingling to face or fingers . Pt. denies fevers, chills, N/V, SOB.       Vital Signs Last 24 Hrs  T(C): 36.6 (05 Jun 2019 08:00), Max: 37.7 (05 Jun 2019 04:00)  T(F): 97.8 (05 Jun 2019 08:00), Max: 99.8 (05 Jun 2019 04:00)  HR: 86 (05 Jun 2019 08:00) (72 - 104)  RR: 18 (05 Jun 2019 08:00) (8 - 34)  SpO2: 98% (05 Jun 2019 08:00) (94% - 100%)       MEDICATIONS  (STANDING):  acetaminophen   Tablet .. 650 milliGRAM(s) Oral every 6 hours  calcitriol   Capsule 1 MICROGram(s) Oral every 12 hours  calcium carbonate    500 mG (Tums) Chewable 2 Tablet(s) Chew every 4 hours  chlorhexidine 4% Liquid 1 Application(s) Topical <User Schedule>  darbepoetin Injectable Syringe 40 MICROGram(s) IV Push every 7 days  dextrose 5% 1000 milliLiter(s) (50 mL/Hr) IV Continuous <Continuous>  docusate sodium 100 milliGRAM(s) Oral two times a day  gabapentin 100 milliGRAM(s) Oral three times a day  heparin  Injectable 5000 Unit(s) SubCutaneous every 8 hours  insulin glargine Injectable (LANTUS) 10 Unit(s) SubCutaneous at bedtime  insulin lispro (HumaLOG) corrective regimen sliding scale   SubCutaneous Before meals and at bedtime  levETIRAcetam 250 milliGRAM(s) Oral two times a day  metoprolol tartrate 25 milliGRAM(s) Oral two times a day  pantoprazole  Injectable 40 milliGRAM(s) IV Push at bedtime  senna 1 Tablet(s) Oral at bedtime  sodium chloride 0.9% lock flush 3 milliLiter(s) IV Push every 8 hours    MEDICATIONS  (PRN):  acetaminophen   Tablet .. 650 milliGRAM(s) Oral every 4 hours PRN Temp greater or equal to 38C (100.4F), Mild Pain (1 - 3)  dextrose 40% Gel 15 Gram(s) Oral once PRN Blood Glucose LESS THAN 70 milliGRAM(s)/deciliter  oxyCODONE    IR 5 milliGRAM(s) Oral every 6 hours PRN Moderate Pain (4 - 6)  oxyCODONE    IR 10 milliGRAM(s) Oral every 6 hours PRN Severe Pain (7 - 10)    PE:  General: Awake and alert in NAD  HEENT: NC/AT, EOMI  Neck: anterior neck incision  Steri-strips intact, Hemovac in place, draining 5 cc of serosanguineous fluid (25 cc overnight) dressing changed, no ttp over incision.   Mouth: mucosa pink/soft, uvula and tongue midline.       I&O's Detail    04 Jun 2019 07:01  -  05 Jun 2019 07:00  --------------------------------------------------------  IN:    dextrose 5%: 950 mL    IV PiggyBack: 50 mL  Total IN: 1000 mL    OUT:    Accordian: 17 mL  Total OUT: 17 mL    Total NET: 983 mL      05 Jun 2019 07:01  -  05 Jun 2019 08:55  --------------------------------------------------------  IN:    dextrose 5%: 50 mL  Total IN: 50 mL    OUT:    Accordian: 25 mL  Total OUT: 25 mL    Total NET: 25 mL        LABS:                        8.0    4.48  )-----------( 153      ( 04 Jun 2019 23:30 )             24.5     06-05    137  |  94<L>  |  23<H>  ----------------------------<  78  4.1   |  32  |  4.6<HH>    Ca    7.2<L>      05 Jun 2019 05:00  Phos  3.1     06-05  Mg     2.2     06-05    TPro  6.0  /  Alb  2.8<L>  /  TBili  0.3  /  DBili  x   /  AST  10  /  ALT  <5  /  AlkPhos  580<H>  06-05

## 2019-06-05 NOTE — DIETITIAN INITIAL EVALUATION ADULT. - OTHER INFO
RD consult received for stage IV sacrum PU; Pt w/ ESRD on HD 64/F with ESRD on HD TTS, tertiary hyperparathyroidism, CVA, seizures, IDDM, HTN admitted for parathyroidectomy. s/p parathyroidectomy -3/4 glands removed Monday - at risk for hungry bone syndrome, w/ low calcium and low phos. During time of assessment this morning, pt was on full liquids and tolerating well. Diet has just  alex upgraded to regular consistency w/ 2 Nepro daily and renal restriction. Will monitor intake and if not adequate/lytes low, will rec liberalizing renal restriction. NKFA. Fecal incontinence 6/4.

## 2019-06-05 NOTE — DIETITIAN INITIAL EVALUATION ADULT. - PERTINENT MEDS FT
heparin, insulin glargine, insulin lispro, pantoprazole, senna, calcium carbonate, calcitriol, docusate, metoprolol

## 2019-06-06 LAB
ALBUMIN SERPL ELPH-MCNC: 2.7 G/DL — LOW (ref 3.5–5.2)
ALBUMIN SERPL ELPH-MCNC: 3.1 G/DL — LOW (ref 3.5–5.2)
ALP SERPL-CCNC: 598 U/L — HIGH (ref 30–115)
ALT FLD-CCNC: <5 U/L — SIGNIFICANT CHANGE UP (ref 0–41)
ANION GAP SERPL CALC-SCNC: 13 MMOL/L — SIGNIFICANT CHANGE UP (ref 7–14)
ANION GAP SERPL CALC-SCNC: 13 MMOL/L — SIGNIFICANT CHANGE UP (ref 7–14)
AST SERPL-CCNC: 9 U/L — SIGNIFICANT CHANGE UP (ref 0–41)
BILIRUB INDIRECT FLD-MCNC: SIGNIFICANT CHANGE UP MG/DL (ref 0.2–1.2)
BILIRUB SERPL-MCNC: <0.2 MG/DL — SIGNIFICANT CHANGE UP (ref 0.2–1.2)
BUN SERPL-MCNC: 14 MG/DL — SIGNIFICANT CHANGE UP (ref 10–20)
BUN SERPL-MCNC: 33 MG/DL — HIGH (ref 10–20)
CALCIUM SERPL-MCNC: 7.7 MG/DL — LOW (ref 8.5–10.1)
CALCIUM SERPL-MCNC: 7.9 MG/DL — LOW (ref 8.5–10.1)
CHLORIDE SERPL-SCNC: 90 MMOL/L — LOW (ref 98–110)
CHLORIDE SERPL-SCNC: 94 MMOL/L — LOW (ref 98–110)
CO2 SERPL-SCNC: 28 MMOL/L — SIGNIFICANT CHANGE UP (ref 17–32)
CO2 SERPL-SCNC: 30 MMOL/L — SIGNIFICANT CHANGE UP (ref 17–32)
CREAT SERPL-MCNC: 2.9 MG/DL — HIGH (ref 0.7–1.5)
CREAT SERPL-MCNC: 5.9 MG/DL — CRITICAL HIGH (ref 0.7–1.5)
GLUCOSE BLDC GLUCOMTR-MCNC: 113 MG/DL — HIGH (ref 70–99)
GLUCOSE BLDC GLUCOMTR-MCNC: 245 MG/DL — HIGH (ref 70–99)
GLUCOSE BLDC GLUCOMTR-MCNC: 273 MG/DL — HIGH (ref 70–99)
GLUCOSE BLDC GLUCOMTR-MCNC: 72 MG/DL — SIGNIFICANT CHANGE UP (ref 70–99)
GLUCOSE SERPL-MCNC: 263 MG/DL — HIGH (ref 70–99)
GLUCOSE SERPL-MCNC: 95 MG/DL — SIGNIFICANT CHANGE UP (ref 70–99)
POTASSIUM SERPL-MCNC: 3.4 MMOL/L — LOW (ref 3.5–5)
POTASSIUM SERPL-MCNC: 4.4 MMOL/L — SIGNIFICANT CHANGE UP (ref 3.5–5)
POTASSIUM SERPL-SCNC: 3.4 MMOL/L — LOW (ref 3.5–5)
POTASSIUM SERPL-SCNC: 4.4 MMOL/L — SIGNIFICANT CHANGE UP (ref 3.5–5)
PROT SERPL-MCNC: 5.9 G/DL — LOW (ref 6–8)
SODIUM SERPL-SCNC: 133 MMOL/L — LOW (ref 135–146)
SODIUM SERPL-SCNC: 135 MMOL/L — SIGNIFICANT CHANGE UP (ref 135–146)

## 2019-06-06 PROCEDURE — 71045 X-RAY EXAM CHEST 1 VIEW: CPT | Mod: 26

## 2019-06-06 PROCEDURE — 99233 SBSQ HOSP IP/OBS HIGH 50: CPT

## 2019-06-06 RX ORDER — ACETAMINOPHEN 500 MG
650 TABLET ORAL EVERY 6 HOURS
Refills: 0 | Status: COMPLETED | OUTPATIENT
Start: 2019-06-06 | End: 2019-06-09

## 2019-06-06 RX ORDER — APIXABAN 2.5 MG/1
2.5 TABLET, FILM COATED ORAL EVERY 12 HOURS
Refills: 0 | Status: DISCONTINUED | OUTPATIENT
Start: 2019-06-06 | End: 2019-06-09

## 2019-06-06 RX ORDER — METOPROLOL TARTRATE 50 MG
25 TABLET ORAL
Refills: 0 | Status: DISCONTINUED | OUTPATIENT
Start: 2019-06-06 | End: 2019-06-09

## 2019-06-06 RX ORDER — INSULIN LISPRO 100/ML
VIAL (ML) SUBCUTANEOUS
Refills: 0 | Status: DISCONTINUED | OUTPATIENT
Start: 2019-06-06 | End: 2019-06-09

## 2019-06-06 RX ADMIN — SODIUM CHLORIDE 3 MILLILITER(S): 9 INJECTION INTRAMUSCULAR; INTRAVENOUS; SUBCUTANEOUS at 21:47

## 2019-06-06 RX ADMIN — Medication 25 MILLIGRAM(S): at 17:14

## 2019-06-06 RX ADMIN — APIXABAN 2.5 MILLIGRAM(S): 2.5 TABLET, FILM COATED ORAL at 17:11

## 2019-06-06 RX ADMIN — SENNA PLUS 2 TABLET(S): 8.6 TABLET ORAL at 21:20

## 2019-06-06 RX ADMIN — SODIUM CHLORIDE 3 MILLILITER(S): 9 INJECTION INTRAMUSCULAR; INTRAVENOUS; SUBCUTANEOUS at 14:06

## 2019-06-06 RX ADMIN — PANTOPRAZOLE SODIUM 40 MILLIGRAM(S): 20 TABLET, DELAYED RELEASE ORAL at 06:03

## 2019-06-06 RX ADMIN — Medication 1 APPLICATION(S): at 06:02

## 2019-06-06 RX ADMIN — CALCITRIOL 1 MICROGRAM(S): 0.5 CAPSULE ORAL at 06:00

## 2019-06-06 RX ADMIN — Medication 2 TABLET(S): at 21:20

## 2019-06-06 RX ADMIN — GABAPENTIN 100 MILLIGRAM(S): 400 CAPSULE ORAL at 06:00

## 2019-06-06 RX ADMIN — CALCITRIOL 1 MICROGRAM(S): 0.5 CAPSULE ORAL at 17:15

## 2019-06-06 RX ADMIN — Medication 650 MILLIGRAM(S): at 17:10

## 2019-06-06 RX ADMIN — Medication 1 APPLICATION(S): at 17:16

## 2019-06-06 RX ADMIN — Medication 2 TABLET(S): at 06:00

## 2019-06-06 RX ADMIN — Medication 100 MILLIGRAM(S): at 06:00

## 2019-06-06 RX ADMIN — Medication 650 MILLIGRAM(S): at 06:30

## 2019-06-06 RX ADMIN — Medication 4: at 16:51

## 2019-06-06 RX ADMIN — Medication 25 MILLIGRAM(S): at 05:59

## 2019-06-06 RX ADMIN — SODIUM CHLORIDE 3 MILLILITER(S): 9 INJECTION INTRAMUSCULAR; INTRAVENOUS; SUBCUTANEOUS at 06:03

## 2019-06-06 RX ADMIN — LEVETIRACETAM 250 MILLIGRAM(S): 250 TABLET, FILM COATED ORAL at 06:00

## 2019-06-06 RX ADMIN — LEVETIRACETAM 250 MILLIGRAM(S): 250 TABLET, FILM COATED ORAL at 17:12

## 2019-06-06 RX ADMIN — CHLORHEXIDINE GLUCONATE 1 APPLICATION(S): 213 SOLUTION TOPICAL at 06:00

## 2019-06-06 RX ADMIN — Medication 650 MILLIGRAM(S): at 05:59

## 2019-06-06 RX ADMIN — Medication 650 MILLIGRAM(S): at 00:07

## 2019-06-06 RX ADMIN — GABAPENTIN 100 MILLIGRAM(S): 400 CAPSULE ORAL at 17:09

## 2019-06-06 RX ADMIN — HEPARIN SODIUM 5000 UNIT(S): 5000 INJECTION INTRAVENOUS; SUBCUTANEOUS at 05:59

## 2019-06-06 RX ADMIN — GABAPENTIN 100 MILLIGRAM(S): 400 CAPSULE ORAL at 21:20

## 2019-06-06 RX ADMIN — INSULIN GLARGINE 10 UNIT(S): 100 INJECTION, SOLUTION SUBCUTANEOUS at 21:46

## 2019-06-06 RX ADMIN — Medication 2 TABLET(S): at 17:08

## 2019-06-06 RX ADMIN — Medication 2 TABLET(S): at 01:55

## 2019-06-06 RX ADMIN — AMLODIPINE BESYLATE 5 MILLIGRAM(S): 2.5 TABLET ORAL at 06:00

## 2019-06-06 RX ADMIN — Medication 650 MILLIGRAM(S): at 00:37

## 2019-06-06 RX ADMIN — Medication 100 MILLIGRAM(S): at 17:13

## 2019-06-06 NOTE — PROGRESS NOTE ADULT - SUBJECTIVE AND OBJECTIVE BOX
GIL GARCIA  [id number]  64y Female    Indication for ICU admission: s/p parathyroidectomy secondary to tertiary hyperparathyroidism, upgraded to SICU for close monitoring 2/2 multiple comorbotities and possible electrolyte derrangments     Admit Date: 6/3  ICU Date: 6/3  OR Date: 6/3    No Known Allergies    PAST MEDICAL & SURGICAL HISTORY:  Atrial fibrillation  Murmur  Peripheral neuropathy  Seizures: 2009, per pt none since  Diabetes  ESRD (end stage renal disease) on dialysis: tues, th,sa  Stroke: 2009, 2013  HTN (hypertension)  H/O umbilical hernia repair  H/O tubal ligation  S/P arteriovenous (AV) fistula creation    Home Medications:  Colace 100 mg oral capsule: 1 cap(s) orally 2 times a day (03 Jun 2019 10:11)  Eliquis 2.5 mg oral tablet: 1 tab(s) orally 2 times a day (03 Jun 2019 10:11)  gabapentin 100 mg oral tablet: orally 3 times a day (03 Jun 2019 10:11)  Keppra 250 mg oral tablet: 1 tab(s) orally 2 times a day (03 Jun 2019 10:11)  Lantus 100 units/mL subcutaneous solution: 10 unit(s) subcutaneous once a day (at bedtime) (03 Jun 2019 10:11)  Metoprolol Tartrate 25 mg oral tablet: 1 tab(s) orally 2 times a day (03 Jun 2019 10:11)  Norvasc 5 mg oral tablet: 1 tab(s) orally once a day (03 Jun 2019 10:11)  NovoLOG 100 units/mL subcutaneous solution: sliding scale , ac (03 Jun 2019 10:11)  raNITIdine 150 mg oral tablet: orally once a day (03 Jun 2019 10:11)  sevelamer hydrochloride 800 mg oral tablet: 2 tab(s) orally 3 times a day (03 Jun 2019 10:11)    24HRS EVENT:  64yFemale s/p parathyroidectomy for tertiary hyperparathyroidism     NEURO:   Post op pain control:  -Tylenol , oxycodone PRN   TBI/seizure: (2009?)  with residual right sided contraction  - continue home Keppra  Neuropathy:  -continue gabapentin    RESP:   - No acute diagnosis  - Nasal Cannula as needed, maintain O saturation above 94%.  - IS/PT    CARDS:  History of Afib, HTN  - restarted on home amlodipine, metoprolol  - holding home eliquis, will restart per primary team  - Maintain MAP above 65    GI/NUTR:   Diet:  - advanced to renal diet, added Nepro cans twice a day  -PPI  - Bowel regimen    /RENAL:   ESRD:  - dialysis on T,Th,S, access via left arm AV fistula.  Plan for HD on thur.  -IVL  - No brar, per patient she voids once a week    HEME/ONC:   - No acute diagnosis  - Heparin S/Q, SCD  -symptomatic anemia with hg 7.6 & hypotension > tx 1uPRBC on 6/4, last h/h 8.4/24.5 stable.    ID:   -No acute diagnosis    ENDO:  S/P parathyroidectomy: strict monitoring for hypocalcemia and hungry bone syndrome  - DO NOT start on phosphate binders  - Monitor for S/S of hypocalcemia: prolonged QTC, tetany, arrhythmia, seizures, Chovostek, trousseaus   -follow up PTH  - trend calcium/albumin/mag/phos Q 6 hrs, last   - Cont Calcitriol 1mcg po BID  - Cont Ca carbonate 2tabs q4h   -maintain corrected calcium range between 7.5-8.5  - On D5W with 10 amps of calcium gluconate gtt, adjust by increments of 10cc as needed.  Last corrected Ca 8.3, current gtt @ 30cc/hr. f/up 1700 Ca/ALb  - Dr. Horne [phone]    Diabetes:  - Home lantus, ISS      DVT PTX:  Hep SQ/SCD  GI PTX: PPI    ***Tubes/Lines/Drains  ***  Peripheral IV  Right radial janna    REVIEW OF SYSTEMS    [x ] A ten-point review of systems was otherwise negative except as noted.  [ ] Due to altered mental status/intubation, subjective information were not able to be obtained from the patient. History was obtained, to the extent possible, from review of the chart and collateral sources of information. GIL GARCIA  [id number]  64y Female    Indication for ICU admission: s/p parathyroidectomy secondary to tertiary hyperparathyroidism, upgraded to SICU for close monitoring 2/2 multiple comorbotities and possible electrolyte derrangments     Admit Date: 6/3  ICU Date: 6/3  OR Date: 6/3    No Known Allergies    PAST MEDICAL & SURGICAL HISTORY:  Atrial fibrillation  Murmur  Peripheral neuropathy  Seizures: 2009, per pt none since  Diabetes  ESRD (end stage renal disease) on dialysis: tues, ,  Stroke: ,   HTN (hypertension)  H/O umbilical hernia repair  H/O tubal ligation  S/P arteriovenous (AV) fistula creation    Home Medications:  Colace 100 mg oral capsule: 1 cap(s) orally 2 times a day (2019 10:11)  Eliquis 2.5 mg oral tablet: 1 tab(s) orally 2 times a day (2019 10:11)  gabapentin 100 mg oral tablet: orally 3 times a day (2019 10:11)  Keppra 250 mg oral tablet: 1 tab(s) orally 2 times a day (2019 10:11)  Lantus 100 units/mL subcutaneous solution: 10 unit(s) subcutaneous once a day (at bedtime) (2019 10:11)  Metoprolol Tartrate 25 mg oral tablet: 1 tab(s) orally 2 times a day (2019 10:11)  Norvasc 5 mg oral tablet: 1 tab(s) orally once a day (2019 10:11)  NovoLOG 100 units/mL subcutaneous solution: sliding scale , ac (2019 10:11)  raNITIdine 150 mg oral tablet: orally once a day (2019 10:11)  sevelamer hydrochloride 800 mg oral tablet: 2 tab(s) orally 3 times a day (2019 10:11)    24HRS EVENT:  64yFemale s/p parathyroidectomy for tertiary hyperparathyroidism     NEURO:   Post op pain control:  -Tylenol , oxycodone PRN   TBI/seizure: (?)  with residual right sided contraction  - continue home Keppra  Neuropathy:  -continue gabapentin    RESP:   - No acute diagnosis  - Nasal Cannula as needed, maintain O saturation above 94%.  - IS/PT    CARDS:  History of Afib, HTN  - restarted on home amlodipine, metoprolol  - holding home eliquis, will restart per primary team  - Maintain MAP above 65    GI/NUTR:   Diet:  - advanced to renal diet, added Nepro cans twice a day  -PPI  - Bowel regimen    /RENAL:   ESRD:  - dialysis on ,S, access via left arm AV fistula.  Plan for HD on thur.  -IVL  - No brar, per patient she voids once a week    HEME/ONC:   - No acute diagnosis  - Heparin S/Q, SCD  -symptomatic anemia with hg 7.6 & hypotension > tx 1uPRBC on , last h/h 8.4/24.5 stable.    ID:   -No acute diagnosis    ENDO:  S/P parathyroidectomy: strict monitoring for hypocalcemia and hungry bone syndrome  - DO NOT start on phosphate binders  - Monitor for S/S of hypocalcemia: prolonged QTC, tetany, arrhythmia, seizures, Chovost[place], trousseaus   -follow up PTH  - trend calcium/albumin/mag/phos Q 6 hrs, last   - Cont Calcitriol 1mcg po BID  - Cont Ca carbonate 2tabs q4h   -maintain corrected calcium range between 7.5-8.5  - On D5W with 10 amps of calcium gluconate gtt, adjust by increments of 10cc as needed.  Last corrected Ca 8.3, current gtt @ 30cc/hr. f/up 1700 Ca/ALb  - Dr. Horne [phone]    Diabetes:  - Home lantus, ISS      DVT PTX:  Hep SQ/SCD  GI PTX: PPI    ***Tubes/Lines/Drains  ***  Peripheral IV  Right radial janna    REVIEW OF SYSTEMS    [x ] A ten-point review of systems was otherwise negative except as noted.  [ ] Due to altered mental status/intubation, subjective information were not able to be obtained from the patient. History was obtained, to the extent possible, from review of the chart and collateral sources of information.        Daily Weight in k.9 (2019 06:00)    CURRENT MEDS:  Neurologic Medications  acetaminophen   Tablet .. 650 milliGRAM(s) Oral every 4 hours PRN Temp greater or equal to 38C (100.4F), Mild Pain (1 - 3)  acetaminophen   Tablet .. 650 milliGRAM(s) Oral every 6 hours  gabapentin 100 milliGRAM(s) Oral three times a day  levETIRAcetam 250 milliGRAM(s) Oral two times a day  oxyCODONE    IR 5 milliGRAM(s) Oral every 6 hours PRN Moderate Pain (4 - 6)  oxyCODONE    IR 10 milliGRAM(s) Oral every 6 hours PRN Severe Pain (7 - 10)    Respiratory Medications    Cardiovascular Medications  amLODIPine   Tablet 5 milliGRAM(s) Oral daily  metoprolol tartrate 25 milliGRAM(s) Oral two times a day    Gastrointestinal Medications  calcitriol   Capsule 1 MICROGram(s) Oral every 12 hours  calcium carbonate    500 mG (Tums) Chewable 2 Tablet(s) Chew every 4 hours  docusate sodium 100 milliGRAM(s) Oral two times a day  pantoprazole    Tablet 40 milliGRAM(s) Oral before breakfast  senna 2 Tablet(s) Oral at bedtime  sodium chloride 0.9% lock flush 3 milliLiter(s) IV Push every 8 hours    Genitourinary Medications    Hematologic/Oncologic Medications  darbepoetin Injectable Syringe 40 MICROGram(s) IV Push every 7 days  heparin  Injectable 5000 Unit(s) SubCutaneous every 8 hours    Antimicrobial/Immunologic Medications    Endocrine/Metabolic Medications  dextrose 40% Gel 15 Gram(s) Oral once PRN Blood Glucose LESS THAN 70 milliGRAM(s)/deciliter  insulin glargine Injectable (LANTUS) 10 Unit(s) SubCutaneous at bedtime  insulin lispro (HumaLOG) corrective regimen sliding scale   SubCutaneous Before meals and at bedtime    Topical/Other Medications  chlorhexidine 4% Liquid 1 Application(s) Topical <User Schedule>  Dakins Solution - Full Strength 1 Application(s) Topical every 12 hours      ICU Vital Signs Last 24 Hrs  T(C): 35.9 (2019 04:00), Max: 37 (2019 20:00)  T(F): 96.6 (2019 04:00), Max: 98.6 (2019 20:00)  HR: 76 (2019 07:00) (74 - 100)  BP: --  BP(mean): --  ABP: 122/52 (2019 07:00) (82/40 - 164/78)  ABP(mean): 82 (2019 07:00) (56 - 116)  RR: 12 (2019 07:00) (11 - 30)  SpO2: 93% (2019 07:00) (84% - 98%)      I&O's Detail    2019 07:01  -  2019 07:00  --------------------------------------------------------  IN:    dextrose 5%: 160 mL    dextrose 5%: 390 mL    Oral Fluid: 240 mL  Total IN: 790 mL    OUT:    Accordian: 25 mL  Total OUT: 25 mL    Total NET: 765 mL        I&O's Summary    2019 07:01  -  2019 07:00  --------------------------------------------------------  IN: 790 mL / OUT: 25 mL / NET: 765 mL       LABS:  CAPILLARY BLOOD GLUCOSE      POCT Blood Glucose.: 72 mg/dL (2019 06:45)  POCT Blood Glucose.: 172 mg/dL (2019 21:02)  POCT Blood Glucose.: 167 mg/dL (2019 16:36)  POCT Blood Glucose.: 151 mg/dL (2019 10:24)                          8.2    6.23  )-----------( 161      ( 2019 23:00 )             25.3         06    133<L>  |  90<L>  |  33<H>  ----------------------------<  95  4.4   |  30  |  5.9<HH>      Calcium, Total Serum: 7.7 mg/dL (19 @ 05:20)      LFTs:             5.9  | <0.2 | 9        ------------------[598     ( 2019 05:20 )  2.7  | x    | <5                Blood Gas Arterial, Lactate: 0.7 mmoL/L (19 @ 05:11)  Blood Gas Arterial, Lactate: 1.1 mmoL/L (19 @ 00:30)  Blood Gas Arterial, Lactate: 0.6 mmoL/L (19 @ 16:48)    ABG - ( 2019 05:11 )  pH: 7.43  /  pCO2: 41    /  pO2: 65    / HCO3: 27    / Base Excess: 2.2   /  SaO2: 93        ABG - ( 2019 00:30 )  pH: 7.40  /  pCO2: 45    /  pO2: 74    / HCO3: 28    / Base Excess: 2.6   /  SaO2: 94        ABG - ( 2019 16:48 )  pH: 7.38  /  pCO2: 47    /  pO2: 165   / HCO3: 28    / Base Excess: 2.4   /  SaO2: 99              PHYSICAL EXAM:    General/Neuro  GCS:  15      Deficits:  alert & oriented x 3, no focal deficits  Anterior neck area with steri strips, c/d/i  Lungs:  clear to auscultation, Normal expansion/effort.   Cardiovascular : S1, S2.  Regular rate and rhythm.    Cardiac Rhythm: Normal Sinus Rhythm  GI: Abdomen soft, Non-tender, Non-distended.    Extremities: Extremities warm, well-perfused.  No Peripheral edema.    Derm: Good skin turgor, no skin breakdown.    : No Brar catheter

## 2019-06-06 NOTE — PROGRESS NOTE ADULT - ATTENDING COMMENTS
neuro baseline, oxy, arlen, keppra  no resp dx  ntntc on home meds from htn  holding anticoagulation for afib per primary, will ask when to restart  diab diet  esrd hd today  hb 8.2 stable chronic  hsq  dm2 on home lantus  s/p parathyroidectomy off calcium gtt stable nml Ca    plan to downgrade

## 2019-06-06 NOTE — PROGRESS NOTE ADULT - ASSESSMENT
ASSESSMENT:  64/F with ESRD on HD TTS, tertiary hyperparathyroidism, CVA, seizures, IDDM, HTN admitted for parathyroidectomy.    PLAN:    NEURO:   Post op pain control:  -Tylenol , oxycodone PRN   TBI/seizure: (2009?)  with residual right sided contraction  - continue home Keppra  Neuropathy:  -continue gabapentin    RESP:   - No acute diagnosis  - Nasal Cannula as needed, maintain O saturation above 94%.  - IS/PT    CARDS:  History of Afib, HTN  - restarted on home amlodipine, metoprolol  - holding home eliquis, will restart per primary team  - Maintain MAP above 65    GI/NUTR:   Diet:  - advanced to renal diet, added Nepro cans twice a day  -PPI  - Bowel regimen    /RENAL:   ESRD:  - dialysis on T,Th,S, access via left arm AV fistula.  Plan for HD on thur.  -IVL  - No brar, per patient she voids once a week    HEME/ONC:   - No acute diagnosis  - Heparin S/Q, SCD  -symptomatic anemia with hg 7.6 & hypotension > tx 1uPRBC on 6/4, last h/h 8.4/24.5 stable.    ID:   -No acute diagnosis    ENDO:  S/P parathyroidectomy: strict monitoring for hypocalcemia and hungry bone syndrome  - DO NOT start on phosphate binders  - Monitor for S/S of hypocalcemia: prolonged QTC, tetany, arrhythmia, seizures, Chovost[place], trousseaus   -follow up PTH  - trend calcium/albumin/mag/phos Q 6 hrs, last   - Cont Calcitriol 1mcg po BID  - Cont Ca carbonate 2tabs q4h   -maintain corrected calcium range between 7.5-8.5  - On D5W with 10 amps of calcium gluconate gtt, adjust by increments of 10cc as needed.  Last corrected Ca 8.3, current gtt @ 30cc/hr. f/up 1700 Ca/ALb  - Dr. Horne [phone]    Diabetes:  - Home lantus, ISS      Disposition: Continue ICU care. ASSESSMENT:  64/F with ESRD on HD TTS, tertiary hyperparathyroidism, CVA, seizures, IDDM, HTN admitted for parathyroidectomy.    PLAN:    NEURO:   Post op pain control:  -Tylenol , oxycodone PRN   TBI/seizure: (2009?)  with residual right sided contraction  - continue home Keppra  Neuropathy:  -continue gabapentin    RESP:   - No acute diagnosis  - Nasal Cannula as needed, maintain O saturation above 94%.  - IS/PT    CARDS:  History of Afib, HTN  - restarted on home amlodipine, metoprolol  - holding home eliquis, will restart per primary team  - Maintain MAP above 65    GI/NUTR:   Diet:  - advanced to renal diet, added Nepro cans twice a day  -PPI  - Bowel regimen    /RENAL:   ESRD:  - dialysis on T,Th,S, access via left arm AV fistula.  Plan for HD on today.  -IVL  - No brar, per patient she voids once a week    HEME/ONC:   - No acute diagnosis  - Heparin S/Q, SCD  -symptomatic anemia with hg 7.6 & hypotension > tx 1uPRBC on 6/4, last h/h 8.2/25.3 stable.    ID:   -No acute diagnosis    ENDO:  S/P parathyroidectomy: strict monitoring for hypocalcemia and hungry bone syndrome  - DO NOT start on phosphate binders  - Monitor for S/S of hypocalcemia: prolonged QTC, tetany, arrhythmia, seizures, Chovost[place], trousseaus   -follow up PTH  - trend calcium/albumin/mag/phos Q 6 hrs, last   - Cont Calcitriol 1mcg po BID  - Cont Ca carbonate 2tabs q4h   -maintain corrected calcium range between 7.5-8.5  - Off Calcium gtt overnight, last corrected Ca 8.7  - Dr. Horne [phone]    Diabetes:  - Home lantus, ISS      Disposition: possible downgrade to the floor

## 2019-06-06 NOTE — PROGRESS NOTE ADULT - ASSESSMENT
64 y.o female s/p 3 gland parathyroidectomy for tertiary hyperparathyroidism - POD #3, doing well    ·	cont to monitor calcium, corrected calcium  ·	replete as needed with IV, currently on PO regimen   ·	renal diet as tolerated  ·	monitor H/H, transfuse prn  ·	cont HD, TTS  ·	renal following  ·	will D/C hemovac today  ·	Dr Parrish at bedside.

## 2019-06-06 NOTE — PROGRESS NOTE ADULT - SUBJECTIVE AND OBJECTIVE BOX
Nephrology progress note    Patient is seen and examined, events over the last 24 h noted .    Allergies:  No Known Allergies    Hospital Medications:   MEDICATIONS  (STANDING):  acetaminophen   Tablet .. 650 milliGRAM(s) Oral every 6 hours  amLODIPine   Tablet 5 milliGRAM(s) Oral daily  calcitriol   Capsule 1 MICROGram(s) Oral every 12 hours  calcium carbonate    500 mG (Tums) Chewable 2 Tablet(s) Chew every 4 hours  Dakins Solution - Full Strength 1 Application(s) Topical every 12 hours  darbepoetin Injectable Syringe 40 MICROGram(s) IV Push every 7 days  docusate sodium 100 milliGRAM(s) Oral two times a day  gabapentin 100 milliGRAM(s) Oral three times a day  heparin  Injectable 5000 Unit(s) SubCutaneous every 8 hours  insulin glargine Injectable (LANTUS) 10 Unit(s) SubCutaneous at bedtime  insulin lispro (HumaLOG) corrective regimen sliding scale   SubCutaneous Before meals and at bedtime  levETIRAcetam 250 milliGRAM(s) Oral two times a day  metoprolol tartrate 25 milliGRAM(s) Oral two times a day  pantoprazole    Tablet 40 milliGRAM(s) Oral before breakfast  senna 2 Tablet(s) Oral at bedtime  sodium chloride 0.9% lock flush 3 milliLiter(s) IV Push every 8 hours        VITALS:  T(F): 98 (06-06-19 @ 08:00), Max: 98.6 (06-05-19 @ 20:00)  HR: 74 (06-06-19 @ 08:00)  BP: --  RR: 21 (06-06-19 @ 08:00)  SpO2: 98% (06-06-19 @ 08:00)  Wt(kg): --    06-04 @ 07:01  -  06-05 @ 07:00  --------------------------------------------------------  IN: 1000 mL / OUT: 17 mL / NET: 983 mL    06-05 @ 07:01  -  06-06 @ 07:00  --------------------------------------------------------  IN: 790 mL / OUT: 25 mL / NET: 765 mL    06-06 @ 07:01  -  06-06 @ 09:30  --------------------------------------------------------  IN: 0 mL / OUT: 0 mL / NET: 0 mL          PHYSICAL EXAM:  Constitutional: NAD  HEENT: anicteric sclera, oropharynx clear, MMM  Neck: No JVD  Respiratory: CTAB, no wheezes, rales or rhonchi  Cardiovascular: S1, S2, RRR  Gastrointestinal: BS+, soft, NT/ND  Extremities: No cyanosis or clubbing. No peripheral edema  :  No brar.   Skin: No rashes    LABS:  06-06    133<L>  |  90<L>  |  33<H>  ----------------------------<  95  4.4   |  30  |  5.9<HH>    Ca    7.7<L>      06 Jun 2019 05:20  Phos  3.1     06-05  Mg     2.0     06-05    TPro  5.9<L>  /  Alb  2.7<L>  /  TBili  <0.2  /  DBili      /  AST  9   /  ALT  <5  /  AlkPhos  598<H>  06-06                          8.2    6.23  )-----------( 161      ( 05 Jun 2019 23:00 )             25.3       Urine Studies:      RADIOLOGY & ADDITIONAL STUDIES: Nephrology progress note    Patient is seen and examined, events over the last 24 h noted .  lying in bed comfortable  No other events     Allergies:  No Known Allergies    Hospital Medications:   MEDICATIONS  (STANDING):  acetaminophen   Tablet .. 650 milliGRAM(s) Oral every 6 hours  amLODIPine   Tablet 5 milliGRAM(s) Oral daily  calcitriol   Capsule 1 MICROGram(s) Oral every 12 hours  calcium carbonate    500 mG (Tums) Chewable 2 Tablet(s) Chew every 4 hours  Dakins Solution - Full Strength 1 Application(s) Topical every 12 hours  darbepoetin Injectable Syringe 40 MICROGram(s) IV Push every 7 days  docusate sodium 100 milliGRAM(s) Oral two times a day  gabapentin 100 milliGRAM(s) Oral three times a day  heparin  Injectable 5000 Unit(s) SubCutaneous every 8 hours  insulin glargine Injectable (LANTUS) 10 Unit(s) SubCutaneous at bedtime  insulin lispro (HumaLOG) corrective regimen sliding scale   SubCutaneous Before meals and at bedtime  levETIRAcetam 250 milliGRAM(s) Oral two times a day  metoprolol tartrate 25 milliGRAM(s) Oral two times a day  pantoprazole    Tablet 40 milliGRAM(s) Oral before breakfast  senna 2 Tablet(s) Oral at bedtime  sodium chloride 0.9% lock flush 3 milliLiter(s) IV Push every 8 hours        VITALS:  T(F): 98 (06-06-19 @ 08:00), Max: 98.6 (06-05-19 @ 20:00)  HR: 74 (06-06-19 @ 08:00)  BP: 105/60  RR: 21 (06-06-19 @ 08:00)  SpO2: 98% (06-06-19 @ 08:00)  Wt(kg): --    06-04 @ 07:01  -  06-05 @ 07:00  --------------------------------------------------------  IN: 1000 mL / OUT: 17 mL / NET: 983 mL    06-05 @ 07:01  -  06-06 @ 07:00  --------------------------------------------------------  IN: 790 mL / OUT: 25 mL / NET: 765 mL    06-06 @ 07:01  -  06-06 @ 09:30  --------------------------------------------------------  IN: 0 mL / OUT: 0 mL / NET: 0 mL          PHYSICAL EXAM:  Constitutional: NAD  HEENT: anicteric sclera, oropharynx clear, MMM  Neck: No JVD/ small oozing from wound   Respiratory: CTAB, no wheezes, rales or rhonchi  Cardiovascular: S1, S2, RRR  Gastrointestinal: BS+, soft, NT/ND  Extremities: No cyanosis or clubbing. No peripheral edema  :  No brar.   Skin: No rashes    LABS:  06-06    133<L>  |  90<L>  |  33<H>  ----------------------------<  95  4.4   |  30  |  5.9<HH>    Ca    7.7<L>      06 Jun 2019 05:20  Phos  3.1     06-05  Mg     2.0     06-05    TPro  5.9<L>  /  Alb  2.7<L>  /  TBili  <0.2  /  DBili      /  AST  9   /  ALT  <5  /  AlkPhos  598<H>  06-06                          8.2    6.23  )-----------( 161      ( 05 Jun 2019 23:00 )             25.3       Urine Studies:      RADIOLOGY & ADDITIONAL STUDIES:

## 2019-06-06 NOTE — PROGRESS NOTE ADULT - SUBJECTIVE AND OBJECTIVE BOX
Pt is 64 y.o female s/p 3 gland parathyroidectomy, POD #3 - doing well. PT remains in the ICU - taken off IV calcium overnight. Pt denies any complaints this AM, RN at bedside states pt doing well. Pt going for HD today.    Vital Signs: T(F): 96.6 (06 Jun 2019 04:00), Max: 98.6 (05 Jun 2019 20:00), HR: 76, RR: 12, SpO2: 93%  GEN: NAD, awake and alert  HEENT: + incision C/D/I, no erythema/edema, no hematoma; hemovac draining minimal serosang    Calcium, Total Serum: 7.7 mg/dL (06.06.19 @ 05:20)

## 2019-06-06 NOTE — PROGRESS NOTE ADULT - ASSESSMENT
64/F with ESRD on HD TTS, tertiary hyperparathyroidism, CVA, seizures, IDDM, HTN admitted for parathyroidectomy.    s/p parathyroidectomy -3/4 glands removed Monday , post op iPTH 277 <-2000 in Apr 2019  - Ca noted better    - continue Ca CO3 current   - on calcitriol will add hectorol on HD then stop calcitriol   - off IV Ca repletion   - no phosphate binders sevelamer d/theodore)  - monitor EKG -QT interval  - repeat iPTH    US thyroid/[parathyroid noted (5/22) thyroid nodule - will need a bx    ESRD -for HD today 3h opti 160 2k UF 2l as tolerated     Anemia - repeat Hb, transfuse if Hb<7.0  will check iron stores on  Darbepoetin 40 with HD weekly    Seizures - cont current meds  monitor for seizures with worsening hypocalcemia    DM - monitor FS, on Insulin      Will follow

## 2019-06-06 NOTE — CHART NOTE - NSCHARTNOTEFT_GEN_A_CORE
SICU PA Note:    65 y/o F with ESRD on HD TTS, tertiary hyperparathyroidism, CVA, seizures, IDDM, HTN admitted for parathyroidectomy, was on Calcium gluconate gtt, now off with stable Ca levels.    PLAN:    NEURO:   Post op pain control:  -Tylenol , oxycodone PRN   TBI/seizure: (2009?)  with residual right sided contraction  - continue home Keppra  Neuropathy:  -continue gabapentin    RESP:   - No acute diagnosis  - Nasal Cannula as needed, maintain O2 saturation above 94%, has been >96% on RA  - IS/PT    CARDS:  History of Afib, HTN  - restarted on home amlodipine, metoprolol  - holding home eliquis, to be restarted today as per primary team  - Maintain MAP above 65    GI/NUTR:   Diet:  - on renal diet, and Nepro cans twice a day  -PPI  - Bowel regimen    /RENAL:   ESRD:  - dialysis on T,Th,S, access via left arm AV fistula.  Plan for HD today.  -IVL  - No brar, per patient she voids once a week, bladder scan as needed    HEME/ONC:   - No acute diagnosis  - Heparin S/Q, SCD  -symptomatic anemia with hg 7.6 & hypotension > tx 1uPRBC on 6/4, last h/h 8.2/25.3 stable.    ID:   -No acute diagnosis    ENDO:  S/P parathyroidectomy: strict monitoring for hypocalcemia and hungry bone syndrome  - DO NOT start on phosphate binders  - Monitor for S/S of hypocalcemia: prolonged QTC, tetany, arrhythmia, seizures, Chovostek, trousseaus   -follow up PTH  - Cont Calcitriol 1mcg po BID  - Cont Ca carbonate 2tabs q4h   -maintain corrected calcium range between 7.5-8.5  - Off Calcium gtt overnight, last corrected Ca 8.7, f/up 1100 Ca  - Dr. Horne [phone]    Diabetes:  - Home lantus, RISS      Disposition: pt is hemodynamically stable for downgrade to the floor.   Sign-out given to ELADIA Gould from ENT team @ 12:09 SICU PA Note:    65 y/o F with ESRD on HD TTS, tertiary hyperparathyroidism, CVA, seizures, IDDM, HTN admitted for parathyroidectomy, was on Calcium gluconate gtt, now off with stable Ca levels.    PLAN:    NEURO:   Post op pain control:  -Tylenol , oxycodone PRN   TBI/seizure: (2009?)  with residual right sided contraction  - continue home Keppra  Neuropathy:  -continue gabapentin    RESP:   - No acute diagnosis  - Nasal Cannula as needed, maintain O2 saturation above 94%, has been >96% on RA  - IS/PT    CARDS:  History of Afib, HTN  - restarted on home amlodipine, metoprolol  - holding home eliquis, to be restarted today as per primary team  - Maintain MAP above 65    GI/NUTR:   Diet:  - on renal diet, and Nepro cans twice a day  -PPI  - Bowel regimen    /RENAL:   ESRD:  - dialysis on T,Th,S, access via left arm AV fistula.  Plan for HD today.  -IVL  - No brar, per patient she voids once a week, bladder scan as needed    HEME/ONC:   - No acute diagnosis  - Heparin S/Q d/c'd, SCD, resumed Eliquis for AFib today 2.5mg bid  -symptomatic anemia with hg 7.6 & hypotension > tx 1uPRBC on 6/4, last h/h 8.2/25.3 stable.    ID:   -No acute diagnosis    ENDO:  S/P parathyroidectomy: strict monitoring for hypocalcemia and hungry bone syndrome  - DO NOT start on phosphate binders  - Monitor for S/S of hypocalcemia: prolonged QTC, tetany, arrhythmia, seizures, Chovostek, trousseaus   -follow up PTH  - Cont Calcitriol 1mcg po BID  - Cont Ca carbonate 2tabs q4h   -maintain corrected calcium range between 7.5-8.5  - Off Calcium gtt overnight, last corrected Ca 8.7, f/up 1100 Ca  - Dr. Horne [phone]    Diabetes:  - Home lantus, RISS      Disposition: pt is hemodynamically stable for downgrade to the floor.   Sign-out given to ELADIA Gould from ENT team @ 12:09

## 2019-06-07 ENCOUNTER — TRANSCRIPTION ENCOUNTER (OUTPATIENT)
Age: 64
End: 2019-06-07

## 2019-06-07 LAB
ALBUMIN SERPL ELPH-MCNC: 2.8 G/DL — LOW (ref 3.5–5.2)
ALBUMIN SERPL ELPH-MCNC: 3 G/DL — LOW (ref 3.5–5.2)
CALCIUM SERPL-MCNC: 7.5 MG/DL — LOW (ref 8.5–10.1)
CALCIUM SERPL-MCNC: 8 MG/DL — LOW (ref 8.5–10.1)
GLUCOSE BLDC GLUCOMTR-MCNC: 108 MG/DL — HIGH (ref 70–99)
GLUCOSE BLDC GLUCOMTR-MCNC: 126 MG/DL — HIGH (ref 70–99)
GLUCOSE BLDC GLUCOMTR-MCNC: 208 MG/DL — HIGH (ref 70–99)
GLUCOSE BLDC GLUCOMTR-MCNC: 80 MG/DL — SIGNIFICANT CHANGE UP (ref 70–99)

## 2019-06-07 RX ORDER — IRON SUCROSE 20 MG/ML
100 INJECTION, SOLUTION INTRAVENOUS
Refills: 0 | Status: DISCONTINUED | OUTPATIENT
Start: 2019-06-07 | End: 2019-06-09

## 2019-06-07 RX ADMIN — SODIUM CHLORIDE 3 MILLILITER(S): 9 INJECTION INTRAMUSCULAR; INTRAVENOUS; SUBCUTANEOUS at 13:21

## 2019-06-07 RX ADMIN — GABAPENTIN 100 MILLIGRAM(S): 400 CAPSULE ORAL at 22:13

## 2019-06-07 RX ADMIN — APIXABAN 2.5 MILLIGRAM(S): 2.5 TABLET, FILM COATED ORAL at 17:19

## 2019-06-07 RX ADMIN — Medication 650 MILLIGRAM(S): at 17:18

## 2019-06-07 RX ADMIN — APIXABAN 2.5 MILLIGRAM(S): 2.5 TABLET, FILM COATED ORAL at 05:46

## 2019-06-07 RX ADMIN — Medication 1 APPLICATION(S): at 17:19

## 2019-06-07 RX ADMIN — Medication 100 MILLIGRAM(S): at 05:48

## 2019-06-07 RX ADMIN — Medication 650 MILLIGRAM(S): at 05:46

## 2019-06-07 RX ADMIN — Medication 2 TABLET(S): at 14:26

## 2019-06-07 RX ADMIN — LEVETIRACETAM 250 MILLIGRAM(S): 250 TABLET, FILM COATED ORAL at 05:48

## 2019-06-07 RX ADMIN — AMLODIPINE BESYLATE 5 MILLIGRAM(S): 2.5 TABLET ORAL at 05:51

## 2019-06-07 RX ADMIN — Medication 650 MILLIGRAM(S): at 06:52

## 2019-06-07 RX ADMIN — Medication 1 APPLICATION(S): at 05:36

## 2019-06-07 RX ADMIN — CALCITRIOL 1 MICROGRAM(S): 0.5 CAPSULE ORAL at 17:18

## 2019-06-07 RX ADMIN — Medication 650 MILLIGRAM(S): at 00:30

## 2019-06-07 RX ADMIN — Medication 2 TABLET(S): at 22:13

## 2019-06-07 RX ADMIN — SODIUM CHLORIDE 3 MILLILITER(S): 9 INJECTION INTRAMUSCULAR; INTRAVENOUS; SUBCUTANEOUS at 06:53

## 2019-06-07 RX ADMIN — Medication 2 TABLET(S): at 17:18

## 2019-06-07 RX ADMIN — SENNA PLUS 2 TABLET(S): 8.6 TABLET ORAL at 22:13

## 2019-06-07 RX ADMIN — SODIUM CHLORIDE 3 MILLILITER(S): 9 INJECTION INTRAMUSCULAR; INTRAVENOUS; SUBCUTANEOUS at 23:04

## 2019-06-07 RX ADMIN — Medication 2 TABLET(S): at 01:49

## 2019-06-07 RX ADMIN — LEVETIRACETAM 250 MILLIGRAM(S): 250 TABLET, FILM COATED ORAL at 17:19

## 2019-06-07 RX ADMIN — Medication 25 MILLIGRAM(S): at 05:51

## 2019-06-07 RX ADMIN — Medication 2 TABLET(S): at 05:48

## 2019-06-07 RX ADMIN — Medication 650 MILLIGRAM(S): at 00:08

## 2019-06-07 RX ADMIN — Medication 2: at 22:10

## 2019-06-07 RX ADMIN — INSULIN GLARGINE 10 UNIT(S): 100 INJECTION, SOLUTION SUBCUTANEOUS at 22:12

## 2019-06-07 RX ADMIN — GABAPENTIN 100 MILLIGRAM(S): 400 CAPSULE ORAL at 14:26

## 2019-06-07 RX ADMIN — Medication 650 MILLIGRAM(S): at 23:56

## 2019-06-07 RX ADMIN — Medication 2 TABLET(S): at 11:23

## 2019-06-07 RX ADMIN — Medication 650 MILLIGRAM(S): at 11:22

## 2019-06-07 RX ADMIN — Medication 100 MILLIGRAM(S): at 17:18

## 2019-06-07 RX ADMIN — Medication 25 MILLIGRAM(S): at 17:19

## 2019-06-07 RX ADMIN — GABAPENTIN 100 MILLIGRAM(S): 400 CAPSULE ORAL at 05:46

## 2019-06-07 RX ADMIN — CALCITRIOL 1 MICROGRAM(S): 0.5 CAPSULE ORAL at 05:47

## 2019-06-07 RX ADMIN — PANTOPRAZOLE SODIUM 40 MILLIGRAM(S): 20 TABLET, DELAYED RELEASE ORAL at 05:47

## 2019-06-07 NOTE — PROGRESS NOTE ADULT - SUBJECTIVE AND OBJECTIVE BOX
Pt is a  64 y.o female s/p 3 gland parathyroidectomy, POD #4 - doing well. PT downgraded from ICU last night, stable. Pt seen and examined at bedside, doing well - no complaints.     Vital Signs: T(F): 97.6 (07 Jun 2019 07:10), Max: 97.7 (07 Jun 2019 00:00), HR: 66, BP: 134/63, RR: 18, SpO2: 98%  GEN: NAD, awake and alert.  HEENT: + incision midline, intact, no erythema/edema, no hematoma palpated. steri strips with some dry blood, no active bleeding noted. hemovac D/C'd last night.    Calcium, Total Serum (06.07.19 @ 04:00)    Calcium, Total Serum: 7.5 mg/dL    corrected: 8.5

## 2019-06-07 NOTE — PROGRESS NOTE ADULT - ASSESSMENT
64/F with ESRD on HD TTS, tertiary hyperparathyroidism, CVA, seizures, IDDM, HTN admitted for parathyroidectomy.    s/p parathyroidectomy -day 4, post op iPTH 255   - Ca corrected 8.8 mg%  - continue Ca CO3 current   - on calcitriol will add hectorol on HD   - off IV Ca repletion   - no phosphate binders sevelamer d/theodore)    US thyroid/[parathyroid noted (5/22) thyroid nodule - will need a bx    ESRD -HD due tomorrow    Anemia - repeat Hb, transfuse if Hb<7.0  iron stores Tsat 22 Ferr 450,  on  Darbepoetin 40 with HD weekly  - will give Venofer with HD as well    Seizures - cont current meds  monitor for seizures with worsening hypocalcemia    DM - monitor FS, on Insulin    SW wheelchair bound, d/c planning    Will follow

## 2019-06-07 NOTE — DISCHARGE NOTE PROVIDER - NSDCCPTREATMENT_GEN_ALL_CORE_FT
PRINCIPAL PROCEDURE  Procedure: Parathyroidectomy  Findings and Treatment: 3 glands, follow up with Dr Parrish

## 2019-06-07 NOTE — DISCHARGE NOTE PROVIDER - HOSPITAL COURSE
Pt is a 64 y.o female with history of ESRD on HD, hyperparathyroidism, s/p 3 gland parathyroidectomy. Pt did well post operatively, was monitored in the ICU for q4-6 calcium checks. Pt was started on oral calcium and IV calcium. Patient was downgraded from the ICU on 6/6 after being off IV calcium for 12 hours with stable serum corrected calcium levels. Pt remained stable on the floor, was discharged home in stable condition, with wound care for sacral decub, after dialysis on 6/8/19. Pt is a 64 y.o female with history of ESRD on HD, hyperparathyroidism, s/p 3 gland parathyroidectomy. Pt did well post operatively, was monitored in the ICU for q4-6 calcium checks. Pt was started on oral calcium and IV calcium. Patient was downgraded from the ICU on 6/6 after being off IV calcium for 12 hours with stable serum corrected calcium levels. Pt remained stable on the floor, was discharged home in stable condition, with wound care for sacral decub s/p dialysis on 6/8/19.

## 2019-06-07 NOTE — DISCHARGE NOTE PROVIDER - CARE PROVIDERS DIRECT ADDRESSES
[email or web address]

## 2019-06-07 NOTE — DISCHARGE NOTE PROVIDER - CARE PROVIDER_API CALL
Dalton Parrish)  Otolaryngology  [address], [address]nd [place] [address]  Phone: [phone]  Fax: [phone]  Follow Up Time:     Adenike Horne)  Nephrology  [facility] [place] [address]  Phone: [phone]  Fax: [phone]  Follow Up Time:

## 2019-06-07 NOTE — DISCHARGE NOTE PROVIDER - NSDCFUADDINST_GEN_ALL_CORE_FT
Return to ED or call Dr Parrish if develop fever >101.4, swelling over your incision, difficulty breathing or difficulty tolerated food. May shower, may bathe, may get incision wet. Pat area dry, do not rub. Do not have the shower hit you directly on the dressing. Do not remove the strips covering your incision, Dr Parrish will remove them in the office.

## 2019-06-07 NOTE — PROGRESS NOTE ADULT - ASSESSMENT
64 y.o female s/p 3 gland parathyroidectomy, POD #4 - doing well.    ·	f/u renal regarding frequency of calcium checks now that pts level are stable -> a line access D/C'd from ICU and pt is very hard stick  ·	cont calcium PO - calcium carbonate & rocaltrol, will confirm hecterol given yesterday & d/c calcitriol  ·	cont care to sacral ulcer  ·	cont renal diet  ·	HD due tomorrow  ·	will start initiating D/C planning  ·	w/d with attng

## 2019-06-07 NOTE — PROGRESS NOTE ADULT - SUBJECTIVE AND OBJECTIVE BOX
Nephrology progress note    Patient is seen and examined, events over the last 24 h noted .  Doing well, on po Ca supplements and vit D  Allergies:  No Known Allergies    Hospital Medications:   MEDICATIONS  (STANDING):  acetaminophen   Tablet .. 650 milliGRAM(s) Oral every 6 hours  amLODIPine   Tablet 5 milliGRAM(s) Oral daily  apixaban 2.5 milliGRAM(s) Oral every 12 hours  calcitriol   Capsule 1 MICROGram(s) Oral every 12 hours  calcium carbonate    500 mG (Tums) Chewable 2 Tablet(s) Chew every 4 hours  chlorhexidine 4% Liquid 1 Application(s) Topical <User Schedule>  Dakins Solution - Full Strength 1 Application(s) Topical every 12 hours  darbepoetin Injectable Syringe 40 MICROGram(s) IV Push every 7 days  docusate sodium 100 milliGRAM(s) Oral two times a day  gabapentin 100 milliGRAM(s) Oral three times a day  insulin glargine Injectable (LANTUS) 10 Unit(s) SubCutaneous at bedtime  insulin lispro (HumaLOG) corrective regimen sliding scale   SubCutaneous Before meals and at bedtime  levETIRAcetam 250 milliGRAM(s) Oral two times a day  metoprolol tartrate 25 milliGRAM(s) Oral two times a day  pantoprazole    Tablet 40 milliGRAM(s) Oral before breakfast  senna 2 Tablet(s) Oral at bedtime  sodium chloride 0.9% lock flush 3 milliLiter(s) IV Push every 8 hours        VITALS:  T(F): 97.6 (06-07-19 @ 07:10), Max: 97.7 (06-07-19 @ 00:00)  HR: 66 (06-07-19 @ 07:10)  BP: 134/63 (06-07-19 @ 07:10)  RR: 18 (06-07-19 @ 07:10)  SpO2: 98% (06-06-19 @ 19:00)  Wt(kg): --    06-05 @ 07:01  -  06-06 @ 07:00  --------------------------------------------------------  IN: 790 mL / OUT: 25 mL / NET: 765 mL    06-06 @ 07:01 - 06-07 @ 07:00  --------------------------------------------------------  IN: 0 mL / OUT: 2000 mL / NET: -2000 mL    06-07 @ 07:01  - 06-07 @ 13:52  --------------------------------------------------------  IN: 200 mL / OUT: 0 mL / NET: 200 mL          PHYSICAL EXAM:  Constitutional: NAD  HEENT: anicteric sclera, MMM  Neck: No JVD. neck post op incision clean healing well  Respiratory: CTAB, no wheezes, rales or rhonchi  Cardiovascular: S1, S2, RRR  Gastrointestinal: BS+, soft, NT/ND  Extremities: No peripheral edema  Neurological: A/O x 3  : No CVA tenderness. No brar.   Skin: No rashes  Vascular Access: AVF    LABS:  06-06    135  |  94<L>  |  14  ----------------------------<  263<H>  3.4<L>   |  28  |  2.9<H>    Ca    8.0<L>      07 Jun 2019 11:21  Phos  3.1     06-05  Mg     2.0     06-05    TPro      /  Alb  3.0<L>  /  TBili      /  DBili      /  AST      /  ALT      /  AlkPhos      06-07                          8.2    6.23  )-----------( 161      ( 05 Jun 2019 23:00 )             25.3       Urine Studies:      RADIOLOGY & ADDITIONAL STUDIES:

## 2019-06-07 NOTE — DISCHARGE NOTE PROVIDER - NSDCCPCAREPLAN_GEN_ALL_CORE_FT
PRINCIPAL DISCHARGE DIAGNOSIS  Diagnosis: Tertiary hyperparathyroidism  Assessment and Plan of Treatment: Follow up with Dr Parrish on Monday for stitch removal in the office. Continue to take your oral calcium as directed. DO NOT resume your selevamer until cleared by your kidney doctor. PRINCIPAL DISCHARGE DIAGNOSIS  Diagnosis: Tertiary hyperparathyroidism  Assessment and Plan of Treatment: Follow up with Dr Parrish on Monday at 10:00 AM for stitch removal in the office. Continue to take your oral calcium as directed. DO NOT resume your selevamer until cleared by your kidney doctor.

## 2019-06-07 NOTE — CHART NOTE - NSCHARTNOTEFT_GEN_A_CORE
Registered Dietitian Follow-Up     Patient Profile Reviewed                           Yes [X]   No []     Nutrition History Previously Obtained        Yes [X]  No []       Pertinent Subjective Information: Pt reports good sue/PO intake, consuming % of meals + 2 Nepros/day. Prefers mixed berry flavor- provided it to her at bedside.      Pertinent Medical Interventions: Pt is a  64 y.o female s/p 3 gland parathyroidectomy, POD #4 - doing well; ESRD on HD     Diet order: Renal, Nepro BID      Anthropometrics:  - Ht.  - Wt. 69.9 kg (6/6) vs. 62.6 kg (6/3) wt - improving PO/poss fluid in HD pt, will monitor   - %wt change  - BMI  - IBW     Pertinent Lab Data: 6/6: Na 135, K 3.4, BUN 14, Cr 2.9, Glucose 263       Pertinent Meds: oxycodone, docusate, senna, calcium carbonate, calcitriol, senna, amlodipine, metoprolol     Physical Findings:  - Appearance: alert/oriented; no edema  - GI function: no recent BM noted- on bowel regimen  - Tubes:  - Oral/Mouth cavity: tolerating current diet  - Skin: stage IV to sacrum noted     Nutrition Requirements  Weight Used: 62.6 kg      Estimated needs ongoing from 6/5:  Calories: 5604-7204 kcals/day (30-35 kcals/kg ABW) for pt on HD w/ stage IV Protein:  g/day (1.3-1.6 g/kg ABW)- for reasons mentioned above  Fluids: per ICU team    Nutrient Intake: Likely meeting around 75% of needs        [] Previous Nutrition Diagnosis: Increased nutrient needs (ongoing-but stable)- RD to update diet order to request Mixed berry flavor only            [] Ongoing          [] Resolved    Nutrition Intervention: meals and snacks, medical food supplements     Goal/Expected Outcome: PO 75% over next 7 days     Indicator/Monitoring: RD to monitor energy intake, body comp, nfpf (skin), renal profile/lytes Registered Dietitian Follow-Up     Patient Profile Reviewed                           Yes [X]   No []     Nutrition History Previously Obtained        Yes [X]  No []       Pertinent Subjective Information: Pt reports good sue/PO intake, consuming % of meals + 2 Nepros/day. Prefers mixed berry flavor- provided it to her at bedside.      Pertinent Medical Interventions: Pt is a  64 y.o female s/p 3 gland parathyroidectomy, POD #4 - doing well; ESRD on HD     Diet order: Renal, Nepro BID      Anthropometrics:  - Ht.  - Wt. 69.9 kg (6/6) vs. 62.6 kg (6/3) wt - improving PO/poss fluid in HD pt, will monitor   - %wt change  - BMI  - IBW     Pertinent Lab Data: 6/6: Na 135, K 3.4, BUN 14, Cr 2.9, Glucose 263       Pertinent Meds: oxycodone, docusate, senna, calcium carbonate, calcitriol, senna, amlodipine, metoprolol     Physical Findings:  - Appearance: alert/oriented; no edema  - GI function: no recent BM noted- on bowel regimen  - Tubes:  - Oral/Mouth cavity: tolerating current diet  - Skin: stage IV to sacrum noted     Nutrition Requirements  Weight Used: 62.6 kg      Estimated needs ongoing from 6/5:  Calories: 3887-6944 kcals/day (30-35 kcals/kg ABW) for pt on HD w/ stage IV Protein:  g/day (1.3-1.6 g/kg ABW)- for reasons mentioned above  Fluids: per ICU team    Nutrient Intake: Likely meeting around 75% of needs        [] Previous Nutrition Diagnosis: Increased nutrient needs (ongoing-but stable)            [] Ongoing          [] Resolved    Nutrition Intervention: meals and snacks, medical food supplements     Goal/Expected Outcome: PO 75% over next 7 days     Indicator/Monitoring: RD to monitor energy intake, body comp, nfpf (skin), renal profile/lytes    RECS:  1) Continue current diet order and provide mixed berry flavor with Nepro

## 2019-06-08 LAB
ALBUMIN SERPL ELPH-MCNC: 2.8 G/DL — LOW (ref 3.5–5.2)
ANION GAP SERPL CALC-SCNC: 13 MMOL/L — SIGNIFICANT CHANGE UP (ref 7–14)
BUN SERPL-MCNC: 30 MG/DL — HIGH (ref 10–20)
CALCIUM SERPL-MCNC: 7.6 MG/DL — LOW (ref 8.5–10.1)
CHLORIDE SERPL-SCNC: 97 MMOL/L — LOW (ref 98–110)
CO2 SERPL-SCNC: 29 MMOL/L — SIGNIFICANT CHANGE UP (ref 17–32)
CREAT SERPL-MCNC: 5.7 MG/DL — CRITICAL HIGH (ref 0.7–1.5)
GLUCOSE BLDC GLUCOMTR-MCNC: 101 MG/DL — HIGH (ref 70–99)
GLUCOSE BLDC GLUCOMTR-MCNC: 113 MG/DL — HIGH (ref 70–99)
GLUCOSE BLDC GLUCOMTR-MCNC: 124 MG/DL — HIGH (ref 70–99)
GLUCOSE BLDC GLUCOMTR-MCNC: 151 MG/DL — HIGH (ref 70–99)
GLUCOSE BLDC GLUCOMTR-MCNC: 167 MG/DL — HIGH (ref 70–99)
GLUCOSE BLDC GLUCOMTR-MCNC: 48 MG/DL — LOW (ref 70–99)
GLUCOSE SERPL-MCNC: 96 MG/DL — SIGNIFICANT CHANGE UP (ref 70–99)
HCT VFR BLD CALC: 27.2 % — LOW (ref 37–47)
HGB BLD-MCNC: 8.7 G/DL — LOW (ref 12–16)
MCHC RBC-ENTMCNC: 30.6 PG — SIGNIFICANT CHANGE UP (ref 27–31)
MCHC RBC-ENTMCNC: 32 G/DL — SIGNIFICANT CHANGE UP (ref 32–37)
MCV RBC AUTO: 95.8 FL — SIGNIFICANT CHANGE UP (ref 81–99)
NRBC # BLD: 0 /100 WBCS — SIGNIFICANT CHANGE UP (ref 0–0)
PLATELET # BLD AUTO: 212 K/UL — SIGNIFICANT CHANGE UP (ref 130–400)
POTASSIUM SERPL-MCNC: 4.4 MMOL/L — SIGNIFICANT CHANGE UP (ref 3.5–5)
POTASSIUM SERPL-SCNC: 4.4 MMOL/L — SIGNIFICANT CHANGE UP (ref 3.5–5)
RBC # BLD: 2.84 M/UL — LOW (ref 4.2–5.4)
RBC # FLD: 15.1 % — HIGH (ref 11.5–14.5)
SODIUM SERPL-SCNC: 139 MMOL/L — SIGNIFICANT CHANGE UP (ref 135–146)
WBC # BLD: 6.9 K/UL — SIGNIFICANT CHANGE UP (ref 4.8–10.8)
WBC # FLD AUTO: 6.9 K/UL — SIGNIFICANT CHANGE UP (ref 4.8–10.8)

## 2019-06-08 RX ADMIN — SODIUM CHLORIDE 3 MILLILITER(S): 9 INJECTION INTRAMUSCULAR; INTRAVENOUS; SUBCUTANEOUS at 05:35

## 2019-06-08 RX ADMIN — SENNA PLUS 2 TABLET(S): 8.6 TABLET ORAL at 21:48

## 2019-06-08 RX ADMIN — Medication 650 MILLIGRAM(S): at 06:28

## 2019-06-08 RX ADMIN — Medication 100 MILLIGRAM(S): at 05:32

## 2019-06-08 RX ADMIN — AMLODIPINE BESYLATE 5 MILLIGRAM(S): 2.5 TABLET ORAL at 05:33

## 2019-06-08 RX ADMIN — Medication 650 MILLIGRAM(S): at 12:47

## 2019-06-08 RX ADMIN — IRON SUCROSE 210 MILLIGRAM(S): 20 INJECTION, SOLUTION INTRAVENOUS at 10:32

## 2019-06-08 RX ADMIN — CHLORHEXIDINE GLUCONATE 1 APPLICATION(S): 213 SOLUTION TOPICAL at 05:33

## 2019-06-08 RX ADMIN — GABAPENTIN 100 MILLIGRAM(S): 400 CAPSULE ORAL at 15:01

## 2019-06-08 RX ADMIN — APIXABAN 2.5 MILLIGRAM(S): 2.5 TABLET, FILM COATED ORAL at 18:05

## 2019-06-08 RX ADMIN — SODIUM CHLORIDE 3 MILLILITER(S): 9 INJECTION INTRAMUSCULAR; INTRAVENOUS; SUBCUTANEOUS at 15:01

## 2019-06-08 RX ADMIN — Medication 2 TABLET(S): at 15:01

## 2019-06-08 RX ADMIN — Medication 2 TABLET(S): at 18:06

## 2019-06-08 RX ADMIN — Medication 2 TABLET(S): at 02:11

## 2019-06-08 RX ADMIN — Medication 100 MILLIGRAM(S): at 18:04

## 2019-06-08 RX ADMIN — Medication 1 APPLICATION(S): at 21:47

## 2019-06-08 RX ADMIN — GABAPENTIN 100 MILLIGRAM(S): 400 CAPSULE ORAL at 05:35

## 2019-06-08 RX ADMIN — PANTOPRAZOLE SODIUM 40 MILLIGRAM(S): 20 TABLET, DELAYED RELEASE ORAL at 05:37

## 2019-06-08 RX ADMIN — INSULIN GLARGINE 10 UNIT(S): 100 INJECTION, SOLUTION SUBCUTANEOUS at 21:48

## 2019-06-08 RX ADMIN — Medication 650 MILLIGRAM(S): at 05:33

## 2019-06-08 RX ADMIN — CALCITRIOL 1 MICROGRAM(S): 0.5 CAPSULE ORAL at 05:32

## 2019-06-08 RX ADMIN — CALCITRIOL 1 MICROGRAM(S): 0.5 CAPSULE ORAL at 18:05

## 2019-06-08 RX ADMIN — LEVETIRACETAM 250 MILLIGRAM(S): 250 TABLET, FILM COATED ORAL at 18:04

## 2019-06-08 RX ADMIN — Medication 2 TABLET(S): at 05:34

## 2019-06-08 RX ADMIN — Medication 25 MILLIGRAM(S): at 05:32

## 2019-06-08 RX ADMIN — SODIUM CHLORIDE 3 MILLILITER(S): 9 INJECTION INTRAMUSCULAR; INTRAVENOUS; SUBCUTANEOUS at 21:48

## 2019-06-08 RX ADMIN — Medication 1 APPLICATION(S): at 05:35

## 2019-06-08 RX ADMIN — Medication 650 MILLIGRAM(S): at 18:05

## 2019-06-08 RX ADMIN — LEVETIRACETAM 250 MILLIGRAM(S): 250 TABLET, FILM COATED ORAL at 05:32

## 2019-06-08 RX ADMIN — Medication 25 MILLIGRAM(S): at 18:05

## 2019-06-08 RX ADMIN — APIXABAN 2.5 MILLIGRAM(S): 2.5 TABLET, FILM COATED ORAL at 05:34

## 2019-06-08 RX ADMIN — Medication 2 TABLET(S): at 21:46

## 2019-06-08 RX ADMIN — GABAPENTIN 100 MILLIGRAM(S): 400 CAPSULE ORAL at 21:48

## 2019-06-08 NOTE — PROGRESS NOTE ADULT - SUBJECTIVE AND OBJECTIVE BOX
seen and examined  no distress  lying comfortable       Standing Inpatient Medications  acetaminophen   Tablet .. 650 milliGRAM(s) Oral every 6 hours  amLODIPine   Tablet 5 milliGRAM(s) Oral daily  apixaban 2.5 milliGRAM(s) Oral every 12 hours  calcitriol   Capsule 1 MICROGram(s) Oral every 12 hours  calcium carbonate    500 mG (Tums) Chewable 2 Tablet(s) Chew every 4 hours  chlorhexidine 4% Liquid 1 Application(s) Topical <User Schedule>  Dakins Solution - Full Strength 1 Application(s) Topical every 12 hours  darbepoetin Injectable Syringe 40 MICROGram(s) IV Push every 7 days  docusate sodium 100 milliGRAM(s) Oral two times a day  gabapentin 100 milliGRAM(s) Oral three times a day  insulin glargine Injectable (LANTUS) 10 Unit(s) SubCutaneous at bedtime  insulin lispro (HumaLOG) corrective regimen sliding scale   SubCutaneous Before meals and at bedtime  iron sucrose IVPB 100 milliGRAM(s) IV Intermittent <User Schedule>  levETIRAcetam 250 milliGRAM(s) Oral two times a day  metoprolol tartrate 25 milliGRAM(s) Oral two times a day  pantoprazole    Tablet 40 milliGRAM(s) Oral before breakfast  senna 2 Tablet(s) Oral at bedtime  sodium chloride 0.9% lock flush 3 milliLiter(s) IV Push every 8 hours        VITALS/PHYSICAL EXAM  --------------------------------------------------------------------------------  T(C): 35.6 (06-08-19 @ 07:00), Max: 36.7 (06-07-19 @ 15:45)  HR: 71 (06-08-19 @ 07:00) (71 - 82)  BP: 121/60 (06-08-19 @ 07:00) (121/60 - 135/62)  RR: 18 (06-08-19 @ 07:00) (18 - 18)  SpO2: --  Wt(kg): --        06-07-19 @ 07:01 - 06-08-19 @ 07:00  --------------------------------------------------------  IN: 200 mL / OUT: 0 mL / NET: 200 mL      Physical Exam:  	Gen: NAD  	Pulm: decrease BS  B/L  	CV:  S1S2; no rub  	Abd: distended  	LE:  no edema  	Vascular access: av fistula     LABS/STUDIES  --------------------------------------------------------------------------------    135  |  94  |  14  ----------------------------<  263      [06-06-19 @ 16:05]  3.4   |  28  |  2.9        Ca     8.0     [06-07-19 @ 11:21]    TPro  x   /  Alb  3.0  /  TBili  x   /  DBili  x   /  AST  x   /  ALT  x   /  AlkPhos  x   [06-07-19 @ 11:21]          Creatinine Trend:  SCr 2.9 [06-06 @ 16:05]  SCr 5.9 [06-06 @ 05:20]  SCr 5.5 [06-05 @ 23:00]  SCr 5.2 [06-05 @ 16:20]  SCr 5.0 [06-05 @ 10:15]        Iron 35, TIBC 162, %sat 22      [06-04-19 @ 11:00]  Ferritin 465      [06-04-19 @ 11:00]  PTH -- (Ca 7.2)      [06-04-19 @ 11:00]   255  PTH -- (Ca 7.4)      [06-04-19 @ 00:30]   256

## 2019-06-08 NOTE — PROGRESS NOTE ADULT - ASSESSMENT
as above       Plan:  Monitor VS  VNS will be arranged tomorrow, will need dressing changes to sacral decubiti BID  Cont. Calcium Carbonate and Calcitriol    Cont. renal diet   Awaiting for D/C home tomorrow

## 2019-06-08 NOTE — PROGRESS NOTE ADULT - ASSESSMENT
64/F with ESRD on HD TTS, tertiary hyperparathyroidism, CVA, seizures, IDDM, HTN admitted for parathyroidectomy.  s/p parathyroidectomy -day 4, post op iPTH 255   # hd today, 3 k bath, uf 2 liters as tolerated  # check repeat calcium today , on calcitriol, and calcium supplement  # BP well controlled  # on venofer and KARIS , check h/h  # will follow

## 2019-06-08 NOTE — PROGRESS NOTE ADULT - SUBJECTIVE AND OBJECTIVE BOX
Pt is a  64 y.o female POD#5 s/p 3 gland parathyroidectomy, stable, s/p  HD today, awaiting D/C home with VNS. afebrile     Vital Signs Last 24 Hrs  T(C): 37.4 (08 Jun 2019 15:37), Max: 37.4 (08 Jun 2019 15:37)  T(F): 99.4 (08 Jun 2019 15:37), Max: 99.4 (08 Jun 2019 15:37)  HR: 74 (08 Jun 2019 15:37) (71 - 83)  BP: 124/60 (08 Jun 2019 15:37) (121/60 - 155/84)  RR: 18 (08 Jun 2019 15:37) (18 - 18)  SpO2: 95% (08 Jun 2019 07:56) (95% - 95%)    MEDICATIONS  (STANDING):  acetaminophen   Tablet .. 650 milliGRAM(s) Oral every 6 hours  amLODIPine   Tablet 5 milliGRAM(s) Oral daily  apixaban 2.5 milliGRAM(s) Oral every 12 hours  calcitriol   Capsule 1 MICROGram(s) Oral every 12 hours  calcium carbonate    500 mG (Tums) Chewable 2 Tablet(s) Chew every 4 hours  chlorhexidine 4% Liquid 1 Application(s) Topical <User Schedule>  Dakins Solution - Full Strength 1 Application(s) Topical every 12 hours  darbepoetin Injectable Syringe 40 MICROGram(s) IV Push every 7 days  docusate sodium 100 milliGRAM(s) Oral two times a day  gabapentin 100 milliGRAM(s) Oral three times a day  insulin glargine Injectable (LANTUS) 10 Unit(s) SubCutaneous at bedtime  insulin lispro (HumaLOG) corrective regimen sliding scale   SubCutaneous Before meals and at bedtime  iron sucrose IVPB 100 milliGRAM(s) IV Intermittent <User Schedule>  levETIRAcetam 250 milliGRAM(s) Oral two times a day  metoprolol tartrate 25 milliGRAM(s) Oral two times a day  pantoprazole    Tablet 40 milliGRAM(s) Oral before breakfast  senna 2 Tablet(s) Oral at bedtime  sodium chloride 0.9% lock flush 3 milliLiter(s) IV Push every 8 hours    MEDICATIONS  (PRN):  dextrose 40% Gel 15 Gram(s) Oral once PRN Blood Glucose LESS THAN 70 milliGRAM(s)/deciliter  oxyCODONE    IR 5 milliGRAM(s) Oral every 6 hours PRN Moderate Pain (4 - 6)  oxyCODONE    IR 10 milliGRAM(s) Oral every 6 hours PRN Severe Pain (7 - 10)      PE:  General: Awake and Alert in NAD  HEENT; NC/AT, EOMI. no facial asymmetry Chvostek sign negative   Neck: anterior neck incision C/D/I, steri-strips intact. no edema or erythema.   Abd: soft, NT/ND, no suprapubic ttp.     LABS:                        8.7    6.90  )-----------( 212      ( 08 Jun 2019 09:40 )             27.2     06-08    139  |  97<L>  |  30<H>  ----------------------------<  96  4.4   |  29  |  5.7<HH>    Ca    7.6<L>      08 Jun 2019 09:40    TPro  x   /  Alb  2.8<L>  /  TBili  x   /  DBili  x   /  AST  x   /  ALT  x   /  AlkPhos  x   06-08

## 2019-06-09 ENCOUNTER — TRANSCRIPTION ENCOUNTER (OUTPATIENT)
Age: 64
End: 2019-06-09

## 2019-06-09 VITALS
DIASTOLIC BLOOD PRESSURE: 60 MMHG | SYSTOLIC BLOOD PRESSURE: 143 MMHG | RESPIRATION RATE: 18 BRPM | TEMPERATURE: 96 F | HEART RATE: 77 BPM

## 2019-06-09 LAB
GLUCOSE BLDC GLUCOMTR-MCNC: 62 MG/DL — LOW (ref 70–99)
GLUCOSE BLDC GLUCOMTR-MCNC: 87 MG/DL — SIGNIFICANT CHANGE UP (ref 70–99)

## 2019-06-09 RX ORDER — SODIUM HYPOCHLORITE 0.125 %
1 SOLUTION, NON-ORAL MISCELLANEOUS
Qty: 1 | Refills: 0
Start: 2019-06-09 | End: 2019-07-08

## 2019-06-09 RX ORDER — SEVELAMER CARBONATE 2400 MG/1
2 POWDER, FOR SUSPENSION ORAL
Qty: 0 | Refills: 0 | DISCHARGE

## 2019-06-09 RX ORDER — CALCIUM CARBONATE 500(1250)
2 TABLET ORAL
Qty: 360 | Refills: 0
Start: 2019-06-09 | End: 2019-07-08

## 2019-06-09 RX ORDER — CALCITRIOL 0.5 UG/1
2 CAPSULE ORAL
Qty: 120 | Refills: 0
Start: 2019-06-09 | End: 2019-07-08

## 2019-06-09 RX ADMIN — Medication 2 TABLET(S): at 10:35

## 2019-06-09 RX ADMIN — CALCITRIOL 1 MICROGRAM(S): 0.5 CAPSULE ORAL at 06:04

## 2019-06-09 RX ADMIN — Medication 25 MILLIGRAM(S): at 06:04

## 2019-06-09 RX ADMIN — Medication 100 MILLIGRAM(S): at 06:04

## 2019-06-09 RX ADMIN — PANTOPRAZOLE SODIUM 40 MILLIGRAM(S): 20 TABLET, DELAYED RELEASE ORAL at 06:04

## 2019-06-09 RX ADMIN — Medication 1 APPLICATION(S): at 10:34

## 2019-06-09 RX ADMIN — Medication 650 MILLIGRAM(S): at 06:05

## 2019-06-09 RX ADMIN — LEVETIRACETAM 250 MILLIGRAM(S): 250 TABLET, FILM COATED ORAL at 06:04

## 2019-06-09 RX ADMIN — Medication 2 TABLET(S): at 03:59

## 2019-06-09 RX ADMIN — GABAPENTIN 100 MILLIGRAM(S): 400 CAPSULE ORAL at 06:04

## 2019-06-09 RX ADMIN — AMLODIPINE BESYLATE 5 MILLIGRAM(S): 2.5 TABLET ORAL at 06:05

## 2019-06-09 RX ADMIN — SODIUM CHLORIDE 3 MILLILITER(S): 9 INJECTION INTRAMUSCULAR; INTRAVENOUS; SUBCUTANEOUS at 06:06

## 2019-06-09 RX ADMIN — APIXABAN 2.5 MILLIGRAM(S): 2.5 TABLET, FILM COATED ORAL at 06:04

## 2019-06-09 RX ADMIN — CHLORHEXIDINE GLUCONATE 1 APPLICATION(S): 213 SOLUTION TOPICAL at 06:05

## 2019-06-09 RX ADMIN — Medication 2 TABLET(S): at 06:04

## 2019-06-09 NOTE — PROGRESS NOTE ADULT - ASSESSMENT
64 y.o female POD#6 s/p 3 gland parathyroidectomy. Pt. seen and examined today in NAD, voice no complaints, awaiting for D/C.        Plan:  Awaiting D/c home today  Cont Calcium Carbonate and Calcitriol  Cont. renal diet  F/U with Dr. Parrish 6/10/19 for re-eval and steri-strip removal  SW will arrange home care, VNS and wound care

## 2019-06-09 NOTE — PROGRESS NOTE ADULT - SUBJECTIVE AND OBJECTIVE BOX
64 y.o female POD#6 s/p 3 gland parathyroidectomy. Pt. seen and examined today in NAD, voice no complaints, awaiting for D/C. Pt. denies fever, chills, N/V, SOB.  RN called, FS 62 juice will be given, will re-evaluate.  Awaiting for D/C.    Vital Signs Last 24 Hrs  T(C): 37.4 (08 Jun 2019 23:00), Max: 37.4 (08 Jun 2019 15:37)  T(F): 99.3 (08 Jun 2019 23:00), Max: 99.4 (08 Jun 2019 15:37)  HR: 77 (09 Jun 2019 06:00) (71 - 83)  BP: 128/59 (09 Jun 2019 06:00) (100/49 - 155/84)  RR: 18 (08 Jun 2019 23:00) (18 - 18)     PE:  General: Awake and Alert in NAD  HEENT; NC/AT, EOMI. no facial asymmetry, Chvostek sign negative   Neck: anterior neck incision C/D/I, steri-strips intact. no edema or erythema.   Abd: soft, NT/ND, no suprapubic ttp       LABS:                        8.7    6.90  )-----------( 212      ( 08 Jun 2019 09:40 )             27.2     06-08    139  |  97<L>  |  30<H>  ----------------------------<  96  4.4   |  29  |  5.7<HH>    Ca    7.6<L>      08 Jun 2019 09:40    TPro  x   /  Alb  2.8<L>  /  TBili  x   /  DBili  x   /  AST  x   /  ALT  x   /  AlkPhos  x   06-08

## 2019-06-09 NOTE — DISCHARGE NOTE NURSING/CASE MANAGEMENT/SOCIAL WORK - NSDCDPATPORTLINK_GEN_ALL_CORE
You can access the [facility] Patient Portal, offered by [facility], by registering with the following website: [email or web address]

## 2019-06-12 ENCOUNTER — APPOINTMENT (OUTPATIENT)
Dept: OTOLARYNGOLOGY | Facility: CLINIC | Age: 64
End: 2019-06-12
Payer: MEDICAID

## 2019-06-12 DIAGNOSIS — E21.2 OTHER HYPERPARATHYROIDISM: ICD-10-CM

## 2019-06-12 PROCEDURE — 99024 POSTOP FOLLOW-UP VISIT: CPT

## 2019-06-13 DIAGNOSIS — I69.851 HEMIPLEGIA AND HEMIPARESIS FOLLOWING OTHER CEREBROVASCULAR DISEASE AFFECTING RIGHT DOMINANT SIDE: ICD-10-CM

## 2019-06-13 DIAGNOSIS — E21.2 OTHER HYPERPARATHYROIDISM: ICD-10-CM

## 2019-06-13 DIAGNOSIS — I12.0 HYPERTENSIVE CHRONIC KIDNEY DISEASE WITH STAGE 5 CHRONIC KIDNEY DISEASE OR END STAGE RENAL DISEASE: ICD-10-CM

## 2019-06-13 DIAGNOSIS — D64.9 ANEMIA, UNSPECIFIED: ICD-10-CM

## 2019-06-13 DIAGNOSIS — L89.154 PRESSURE ULCER OF SACRAL REGION, STAGE 4: ICD-10-CM

## 2019-06-13 DIAGNOSIS — E83.81 HUNGRY BONE SYNDROME: ICD-10-CM

## 2019-06-13 DIAGNOSIS — E83.51 HYPOCALCEMIA: ICD-10-CM

## 2019-06-13 DIAGNOSIS — G40.909 EPILEPSY, UNSPECIFIED, NOT INTRACTABLE, WITHOUT STATUS EPILEPTICUS: ICD-10-CM

## 2019-06-13 DIAGNOSIS — I48.91 UNSPECIFIED ATRIAL FIBRILLATION: ICD-10-CM

## 2019-06-13 DIAGNOSIS — Z98.51 TUBAL LIGATION STATUS: ICD-10-CM

## 2019-06-13 DIAGNOSIS — I95.9 HYPOTENSION, UNSPECIFIED: ICD-10-CM

## 2019-06-13 DIAGNOSIS — Z99.2 DEPENDENCE ON RENAL DIALYSIS: ICD-10-CM

## 2019-06-13 DIAGNOSIS — Z79.01 LONG TERM (CURRENT) USE OF ANTICOAGULANTS: ICD-10-CM

## 2019-06-13 DIAGNOSIS — R01.1 CARDIAC MURMUR, UNSPECIFIED: ICD-10-CM

## 2019-06-13 DIAGNOSIS — E11.22 TYPE 2 DIABETES MELLITUS WITH DIABETIC CHRONIC KIDNEY DISEASE: ICD-10-CM

## 2019-06-13 DIAGNOSIS — N18.6 END STAGE RENAL DISEASE: ICD-10-CM

## 2019-06-13 DIAGNOSIS — E11.42 TYPE 2 DIABETES MELLITUS WITH DIABETIC POLYNEUROPATHY: ICD-10-CM

## 2019-06-13 DIAGNOSIS — Z86.73 PERSONAL HISTORY OF TRANSIENT ISCHEMIC ATTACK (TIA), AND CEREBRAL INFARCTION WITHOUT RESIDUAL DEFICITS: ICD-10-CM

## 2019-10-17 ENCOUNTER — OUTPATIENT (OUTPATIENT)
Dept: OUTPATIENT SERVICES | Facility: HOSPITAL | Age: 64
LOS: 1 days | Discharge: HOME | End: 2019-10-17

## 2019-10-17 DIAGNOSIS — Z98.890 OTHER SPECIFIED POSTPROCEDURAL STATES: Chronic | ICD-10-CM

## 2019-10-17 DIAGNOSIS — Z98.51 TUBAL LIGATION STATUS: Chronic | ICD-10-CM

## 2019-10-18 DIAGNOSIS — N18.6 END STAGE RENAL DISEASE: ICD-10-CM

## 2019-11-21 ENCOUNTER — OUTPATIENT (OUTPATIENT)
Dept: OUTPATIENT SERVICES | Facility: HOSPITAL | Age: 64
LOS: 1 days | Discharge: HOME | End: 2019-11-21

## 2019-11-21 DIAGNOSIS — Z98.890 OTHER SPECIFIED POSTPROCEDURAL STATES: Chronic | ICD-10-CM

## 2019-11-21 DIAGNOSIS — Z98.51 TUBAL LIGATION STATUS: Chronic | ICD-10-CM

## 2020-02-11 NOTE — PATIENT PROFILE ADULT - NSPROMEDSBROUGHTTOHOSP_GEN_A_NUR
"-- DO NOT REPLY / DO NOT REPLY ALL --  -- Message is from the Mobile Card--      Patient is requesting a medication refill - medication is on active list    Was Medication Pended? Yes. Rx Name and Dose:  allopurinol (ZYLOPRIM) 100 MG tablet    Duration: 90 days    Pharmacy  [id number] - [place], [address]    Patient confirmed the above pharmacy as correct? Yes    Caller Information       Type Contact Phone    02/11/2020 09:39 AM Phone (Incoming) Daniel Abbasi (Self) [phone] Samantha Brooks)          Alternative phone number: NONE    Turnaround time given to caller: ""This message will be sent to [facility] Provider's name]. The clinical team will fulfill your request as soon as they review your message. \""  "
no

## 2020-02-20 NOTE — DISCHARGE NOTE NURSING/CASE MANAGEMENT/SOCIAL WORK - NSTRANSFERBELONGINGSDISPO_GEN_A_NUR
Date of Service: 2/20/2020    ENDOSCOPIST:  Chong Price MD     ASSISTANT:  None     PROCEDURE: Colonoscopy     INSTRUMENT:  Olympus video colonoscope.     EXTENT OF EXAMINATION:  cecum, which was identified by IC valve and appendiceal orifice     LIMITATION OF EXAMINATION:  None.     MEDICATIONS:    Moderate sedation was administered with continuous monitoring of the patient by a trained caregiver under my direct supervision. Please refer to nursing documentation for doses of medications administered intravenously as well as the patient’s status during the procedure. Total sedation (intra-service) time was 3 minutes.    Fentanyl 75 micrograms and Versed for milligrams.    PREOPERATIVE DIAGNOSIS:    History of colon polyps.  Patient had numerous large polyps in August of 2019.    POSTOPERATIVE DIAGNOSIS:   1. Five polyps in the transverse colon ranging in size from 5-12 mm. These polyps were removed using snare polypectomy and setting of 20  2. Total of 6 polyps in the sigmoid colon ulcer in size from 5-12 mm.  These polyps were removed using snare polypectomy and setting of 20     DESCRIPTION OF PROCEDURE:   After explaining risks, benefits and alternatives to the patient including risk of perforation, bleeding, sedation and risk of missing polyps and mass lesions, a written consent was obtained.  The patient was placed in left lateral decubitus position and appropriate sedation was given.  Digital rectal examination was done which showed normal tone and no mass.  Scope was inserted in the rectum and all the way to the cecum with no difficulties.  Cecum was identified by the ileocecal valve and the appendiceal orifice.  Cecum, ascending, transverse, descending and sigmoid colon were examined very carefully upon inserting and withdrawal of the scope where no significant pathology was identified except for total of 11 polyps seen in the transverse and sigmoid colon.  Retroflexion in the rectum demonstrated no  significant pathology.  The procedure was tolerated well and terminated with no complications.    DIAGNOSIS AND IMPRESSION:  Colonoscopy for history of colon polyps findings significant for 11 polyps largest was about 12 mm.  These polyps were removed.  A repeat colonoscopy will be done in 1 year.    SPECIMEN:  As above    ESTIMATED BLOOD LOSS:  None     COMPLICATIONS:  None.     DISPOSITION:  The patient to be allowed to recover in the recovery area per protocols.         not applicable

## 2025-09-04 ENCOUNTER — APPOINTMENT (OUTPATIENT)
Dept: BURN CARE | Facility: CLINIC | Age: 70
End: 2025-09-04
Payer: MEDICARE

## 2025-09-04 ENCOUNTER — OUTPATIENT (OUTPATIENT)
Dept: OUTPATIENT SERVICES | Facility: HOSPITAL | Age: 70
LOS: 1 days | End: 2025-09-04
Payer: MEDICARE

## 2025-09-04 VITALS
HEART RATE: 95 BPM | BODY MASS INDEX: 19.81 KG/M2 | WEIGHT: 116 LBS | DIASTOLIC BLOOD PRESSURE: 77 MMHG | OXYGEN SATURATION: 98 % | SYSTOLIC BLOOD PRESSURE: 134 MMHG | HEIGHT: 64 IN

## 2025-09-04 DIAGNOSIS — S31.000A UNSPECIFIED OPEN WOUND OF LOWER BACK AND PELVIS W/OUT PENETRATION INTO RETROPERITONEUM, INITIAL ENCOUNTER: ICD-10-CM

## 2025-09-04 DIAGNOSIS — Z98.890 OTHER SPECIFIED POSTPROCEDURAL STATES: Chronic | ICD-10-CM

## 2025-09-04 DIAGNOSIS — Z00.8 ENCOUNTER FOR OTHER GENERAL EXAMINATION: ICD-10-CM

## 2025-09-04 DIAGNOSIS — Z98.51 TUBAL LIGATION STATUS: Chronic | ICD-10-CM

## 2025-09-04 PROCEDURE — 99203 OFFICE O/P NEW LOW 30 MIN: CPT

## 2025-09-06 DIAGNOSIS — L89.159 PRESSURE ULCER OF SACRAL REGION, UNSPECIFIED STAGE: ICD-10-CM

## 2025-09-06 DIAGNOSIS — Z74.01 BED CONFINEMENT STATUS: ICD-10-CM

## 2025-09-06 DIAGNOSIS — Z99.3 DEPENDENCE ON WHEELCHAIR: ICD-10-CM
